# Patient Record
Sex: FEMALE | Race: WHITE | NOT HISPANIC OR LATINO | Employment: OTHER | URBAN - METROPOLITAN AREA
[De-identification: names, ages, dates, MRNs, and addresses within clinical notes are randomized per-mention and may not be internally consistent; named-entity substitution may affect disease eponyms.]

---

## 2021-10-21 ENCOUNTER — HOSPITAL ENCOUNTER (OUTPATIENT)
Dept: RADIOLOGY | Facility: HOSPITAL | Age: 66
Discharge: HOME | End: 2021-10-21
Attending: NURSE PRACTITIONER
Payer: MEDICARE

## 2021-10-21 VITALS — BODY MASS INDEX: 34.36 KG/M2 | HEIGHT: 60 IN | WEIGHT: 175 LBS

## 2021-10-21 DIAGNOSIS — Z12.31 BREAST CANCER SCREENING BY MAMMOGRAM: ICD-10-CM

## 2021-10-21 DIAGNOSIS — Z78.0 MENOPAUSE: ICD-10-CM

## 2021-10-21 DIAGNOSIS — J42 CHRONIC BRONCHITIS: ICD-10-CM

## 2021-10-21 PROCEDURE — 77080 DXA BONE DENSITY AXIAL: CPT | Mod: TC

## 2021-10-21 PROCEDURE — 71046 XR CHEST PA AND LATERAL: ICD-10-PCS | Mod: 26,,, | Performed by: RADIOLOGY

## 2021-10-21 PROCEDURE — 77080 DEXA BONE DENSITY SPINE HIP: ICD-10-PCS | Mod: 26,,, | Performed by: RADIOLOGY

## 2021-10-21 PROCEDURE — 77080 DXA BONE DENSITY AXIAL: CPT | Mod: 26,,, | Performed by: RADIOLOGY

## 2021-10-21 PROCEDURE — 71046 X-RAY EXAM CHEST 2 VIEWS: CPT | Mod: 26,,, | Performed by: RADIOLOGY

## 2021-10-21 PROCEDURE — 71046 X-RAY EXAM CHEST 2 VIEWS: CPT | Mod: TC,FY

## 2021-10-21 PROCEDURE — 77063 MAMMO DIGITAL SCREENING BILAT WITH TOMO: ICD-10-PCS | Mod: 26,,, | Performed by: RADIOLOGY

## 2021-10-21 PROCEDURE — 77067 MAMMO DIGITAL SCREENING BILAT WITH TOMO: ICD-10-PCS | Mod: 26,,, | Performed by: RADIOLOGY

## 2021-10-21 PROCEDURE — 77063 BREAST TOMOSYNTHESIS BI: CPT | Mod: 26,,, | Performed by: RADIOLOGY

## 2021-10-21 PROCEDURE — 77067 SCR MAMMO BI INCL CAD: CPT | Mod: 26,,, | Performed by: RADIOLOGY

## 2021-10-21 PROCEDURE — 77067 SCR MAMMO BI INCL CAD: CPT | Mod: TC

## 2022-05-26 ENCOUNTER — HOSPITAL ENCOUNTER (OUTPATIENT)
Dept: RADIOLOGY | Facility: HOSPITAL | Age: 67
Discharge: HOME | End: 2022-05-26
Attending: NURSE PRACTITIONER
Payer: MEDICARE

## 2022-05-26 DIAGNOSIS — R92.8 OTHER ABNORMAL AND INCONCLUSIVE FINDINGS ON DIAGNOSTIC IMAGING OF BREAST: ICD-10-CM

## 2022-05-26 PROCEDURE — 76642 ULTRASOUND BREAST LIMITED: CPT | Mod: 26,LT,, | Performed by: RADIOLOGY

## 2022-05-26 PROCEDURE — 76642 ULTRASOUND BREAST LIMITED: CPT | Mod: TC,LT

## 2022-05-26 PROCEDURE — 77065 MAMMO DIGITAL DIAGNOSTIC LEFT WITH TOMO: ICD-10-PCS | Mod: 26,LT,, | Performed by: RADIOLOGY

## 2022-05-26 PROCEDURE — 77061 BREAST TOMOSYNTHESIS UNI: CPT | Mod: 26,LT,, | Performed by: RADIOLOGY

## 2022-05-26 PROCEDURE — 77065 DX MAMMO INCL CAD UNI: CPT | Mod: TC,LT

## 2022-05-26 PROCEDURE — 76642 US BREAST LEFT LIMITED: ICD-10-PCS | Mod: 26,LT,, | Performed by: RADIOLOGY

## 2022-05-26 PROCEDURE — 77061 MAMMO DIGITAL DIAGNOSTIC LEFT WITH TOMO: ICD-10-PCS | Mod: 26,LT,, | Performed by: RADIOLOGY

## 2022-05-26 PROCEDURE — 77065 DX MAMMO INCL CAD UNI: CPT | Mod: 26,LT,, | Performed by: RADIOLOGY

## 2022-07-06 ENCOUNTER — HOSPITAL ENCOUNTER (OUTPATIENT)
Dept: RADIOLOGY | Facility: HOSPITAL | Age: 67
Discharge: HOME | End: 2022-07-06
Attending: OTOLARYNGOLOGY
Payer: MEDICARE

## 2022-07-06 DIAGNOSIS — Z87.891 HISTORY OF SMOKING GREATER THAN 50 PACK YEARS: ICD-10-CM

## 2022-07-06 PROCEDURE — 71271 CT CHEST LUNG SCREENING LOW DOSE: ICD-10-PCS | Mod: 26,,, | Performed by: RADIOLOGY

## 2022-07-06 PROCEDURE — 71271 CT THORAX LUNG CANCER SCR C-: CPT | Mod: 26,,, | Performed by: RADIOLOGY

## 2022-07-06 PROCEDURE — 71271 CT THORAX LUNG CANCER SCR C-: CPT | Mod: TC

## 2022-08-24 ENCOUNTER — HOSPITAL ENCOUNTER (OUTPATIENT)
Dept: RADIOLOGY | Facility: HOSPITAL | Age: 67
Discharge: HOME | End: 2022-08-24
Attending: OTOLARYNGOLOGY
Payer: MEDICARE

## 2022-08-24 DIAGNOSIS — J32.4 CHRONIC PANSINUSITIS: ICD-10-CM

## 2022-08-24 PROCEDURE — 70486 CT MAXILLOFACIAL W/O DYE: CPT | Mod: 26,,, | Performed by: RADIOLOGY

## 2022-08-24 PROCEDURE — 70486 CT MAXILLOFACIAL W/O DYE: CPT | Mod: TC

## 2022-08-24 PROCEDURE — 70486 CT MAXILLOFACIAL WITHOUT CONTRAST: ICD-10-PCS | Mod: 26,,, | Performed by: RADIOLOGY

## 2022-09-14 ENCOUNTER — HOSPITAL ENCOUNTER (OUTPATIENT)
Dept: PREADMISSION TESTING | Facility: HOSPITAL | Age: 67
Discharge: HOME | End: 2022-09-14
Attending: OTOLARYNGOLOGY
Payer: MEDICARE

## 2022-09-14 ENCOUNTER — ANESTHESIA EVENT (OUTPATIENT)
Dept: SURGERY | Facility: HOSPITAL | Age: 67
End: 2022-09-14
Payer: MEDICARE

## 2022-09-14 ENCOUNTER — HOSPITAL ENCOUNTER (OUTPATIENT)
Dept: CARDIOLOGY | Facility: HOSPITAL | Age: 67
Discharge: HOME | End: 2022-09-14
Attending: OTOLARYNGOLOGY
Payer: MEDICARE

## 2022-09-14 ENCOUNTER — HOSPITAL ENCOUNTER (OUTPATIENT)
Dept: RADIOLOGY | Facility: HOSPITAL | Age: 67
Discharge: HOME | End: 2022-09-14
Attending: OTOLARYNGOLOGY
Payer: MEDICARE

## 2022-09-14 DIAGNOSIS — J32.4 CHRONIC PANSINUSITIS: ICD-10-CM

## 2022-09-14 DIAGNOSIS — J32.4 CHRONIC PANSINUSITIS: Primary | ICD-10-CM

## 2022-09-14 PROCEDURE — 93010 ELECTROCARDIOGRAM REPORT: CPT | Mod: ,,, | Performed by: INTERNAL MEDICINE

## 2022-09-14 PROCEDURE — 93010 EKG 12-LEAD: ICD-10-PCS | Mod: ,,, | Performed by: INTERNAL MEDICINE

## 2022-09-14 PROCEDURE — 71046 X-RAY EXAM CHEST 2 VIEWS: CPT | Mod: TC

## 2022-09-14 PROCEDURE — 71046 XR CHEST PA AND LATERAL: ICD-10-PCS | Mod: 26,,, | Performed by: RADIOLOGY

## 2022-09-14 PROCEDURE — 71046 X-RAY EXAM CHEST 2 VIEWS: CPT | Mod: 26,,, | Performed by: RADIOLOGY

## 2022-09-14 PROCEDURE — 93005 ELECTROCARDIOGRAM TRACING: CPT

## 2022-09-14 NOTE — ANESTHESIA PREPROCEDURE EVALUATION
09/14/2022  Cyndi Cross is a 66 y.o., female.      Pre-op Assessment    I have reviewed the Patient Summary Reports.     I have reviewed the Nursing Notes.    I have reviewed the Medications.     Review of Systems  Anesthesia Hx:  No problems with previous Anesthesia  Neg history of prior surgery. Denies Family Hx of Anesthesia complications.   Denies Personal Hx of Anesthesia complications.   Social:  Smoker    Hematology/Oncology:  Hematology Normal   Oncology Normal     EENT/Dental:EENT/Dental Normal   Cardiovascular:  Cardiovascular Normal     Pulmonary:   COPD, mild    Renal/:  Renal/ Normal     Hepatic/GI:  Hepatic/GI Normal    Musculoskeletal:  Musculoskeletal Normal    Neurological:  Neurology Normal    Endocrine:  Endocrine Normal    Dermatological:  Skin Normal    Psych:  Psychiatric Normal           Physical Exam  General: Alert    Airway:  Mallampati: II   Mouth Opening: Normal  TM Distance: Normal  Neck ROM: Normal ROM    Dental:  Dentures, Partial Dentures    Chest/Lungs:  Clear to auscultation, Normal Respiratory Rate    Heart:  Rate: Normal    Abdomen:  Normal        Anesthesia Plan  Type of Anesthesia, risks & benefits discussed:    Anesthesia Type: Gen ETT  Post Op Pain Control Plan: multimodal analgesia  Airway Plan: Direct, Post-Induction  Informed Consent: Informed consent signed with the Patient and all parties understand the risks and agree with anesthesia plan.  All questions answered. Patient consented to blood products? No  ASA Score: 3  Day of Surgery Review of History & Physical: H&P Update referred to the surgeon/provider.    Ready For Surgery From Anesthesia Perspective.     .

## 2022-09-16 ENCOUNTER — ANESTHESIA (OUTPATIENT)
Dept: SURGERY | Facility: HOSPITAL | Age: 67
End: 2022-09-16
Payer: MEDICARE

## 2022-09-16 ENCOUNTER — HOSPITAL ENCOUNTER (OUTPATIENT)
Facility: HOSPITAL | Age: 67
Discharge: HOME | End: 2022-09-17
Attending: OTOLARYNGOLOGY | Admitting: FAMILY MEDICINE
Payer: MEDICARE

## 2022-09-16 DIAGNOSIS — R07.9 CHEST PAIN: ICD-10-CM

## 2022-09-16 DIAGNOSIS — R07.89 CHEST TIGHTNESS: ICD-10-CM

## 2022-09-16 PROBLEM — J34.2 DEVIATED NASAL SEPTUM: Status: ACTIVE | Noted: 2022-09-16

## 2022-09-16 LAB
ALLENS TEST: NORMAL
DELSYS: NORMAL
FIO2: 40
FLOW: 10
GRAM STN SPEC: NORMAL
HCO3 UR-SCNC: 24.8 MMOL/L (ref 24–28)
KOH PREP SPEC: NORMAL
MODE: NORMAL
PCO2 BLDA: 42.1 MMHG (ref 35–45)
PH SMN: 7.38 [PH] (ref 7.35–7.45)
PO2 BLDA: 89 MMHG (ref 80–100)
POC BE: 0 MMOL/L
POC SATURATED O2: 97 % (ref 95–100)
POC TCO2: 26 MMOL/L (ref 23–27)
SAMPLE: NORMAL
SITE: NORMAL
TROPONIN I SERPL DL<=0.01 NG/ML-MCNC: <0.006 NG/ML (ref 0–0.03)
TROPONIN I SERPL DL<=0.01 NG/ML-MCNC: <0.006 NG/ML (ref 0–0.03)

## 2022-09-16 PROCEDURE — D9220A PRA ANESTHESIA: Mod: CRNA,,, | Performed by: NURSE ANESTHETIST, CERTIFIED REGISTERED

## 2022-09-16 PROCEDURE — D9220A PRA ANESTHESIA: Mod: ANES,,, | Performed by: ANESTHESIOLOGY

## 2022-09-16 PROCEDURE — 37000009 HC ANESTHESIA EA ADD 15 MINS: Performed by: OTOLARYNGOLOGY

## 2022-09-16 PROCEDURE — 25000003 PHARM REV CODE 250: Performed by: FAMILY MEDICINE

## 2022-09-16 PROCEDURE — G0378 HOSPITAL OBSERVATION PER HR: HCPCS

## 2022-09-16 PROCEDURE — 88305 TISSUE EXAM BY PATHOLOGIST: CPT | Mod: 26,,, | Performed by: STUDENT IN AN ORGANIZED HEALTH CARE EDUCATION/TRAINING PROGRAM

## 2022-09-16 PROCEDURE — 25000003 PHARM REV CODE 250: Performed by: NURSE ANESTHETIST, CERTIFIED REGISTERED

## 2022-09-16 PROCEDURE — 99900035 HC TECH TIME PER 15 MIN (STAT)

## 2022-09-16 PROCEDURE — 27000221 HC OXYGEN, UP TO 24 HOURS

## 2022-09-16 PROCEDURE — 25000242 PHARM REV CODE 250 ALT 637 W/ HCPCS: Performed by: ANESTHESIOLOGY

## 2022-09-16 PROCEDURE — 87102 FUNGUS ISOLATION CULTURE: CPT | Performed by: OTOLARYNGOLOGY

## 2022-09-16 PROCEDURE — 88305 TISSUE EXAM BY PATHOLOGIST: CPT | Performed by: STUDENT IN AN ORGANIZED HEALTH CARE EDUCATION/TRAINING PROGRAM

## 2022-09-16 PROCEDURE — 37000008 HC ANESTHESIA 1ST 15 MINUTES: Performed by: OTOLARYNGOLOGY

## 2022-09-16 PROCEDURE — 87205 SMEAR GRAM STAIN: CPT | Performed by: OTOLARYNGOLOGY

## 2022-09-16 PROCEDURE — 93010 EKG 12-LEAD: ICD-10-PCS | Mod: ,,, | Performed by: INTERNAL MEDICINE

## 2022-09-16 PROCEDURE — 99220 PR INITIAL OBSERVATION CARE,LEVL III: ICD-10-PCS | Mod: ,,, | Performed by: FAMILY MEDICINE

## 2022-09-16 PROCEDURE — D9220A PRA ANESTHESIA: ICD-10-PCS | Mod: ANES,,, | Performed by: ANESTHESIOLOGY

## 2022-09-16 PROCEDURE — 36000708 HC OR TIME LEV III 1ST 15 MIN: Performed by: OTOLARYNGOLOGY

## 2022-09-16 PROCEDURE — 71000033 HC RECOVERY, INTIAL HOUR: Performed by: OTOLARYNGOLOGY

## 2022-09-16 PROCEDURE — 84484 ASSAY OF TROPONIN QUANT: CPT | Mod: 91 | Performed by: FAMILY MEDICINE

## 2022-09-16 PROCEDURE — 71000039 HC RECOVERY, EACH ADD'L HOUR: Performed by: OTOLARYNGOLOGY

## 2022-09-16 PROCEDURE — 87075 CULTR BACTERIA EXCEPT BLOOD: CPT | Performed by: OTOLARYNGOLOGY

## 2022-09-16 PROCEDURE — 87210 SMEAR WET MOUNT SALINE/INK: CPT | Performed by: OTOLARYNGOLOGY

## 2022-09-16 PROCEDURE — 36600 WITHDRAWAL OF ARTERIAL BLOOD: CPT

## 2022-09-16 PROCEDURE — 63600175 PHARM REV CODE 636 W HCPCS

## 2022-09-16 PROCEDURE — 87076 CULTURE ANAEROBE IDENT EACH: CPT | Mod: 59 | Performed by: OTOLARYNGOLOGY

## 2022-09-16 PROCEDURE — 88305 TISSUE EXAM BY PATHOLOGIST: ICD-10-PCS | Mod: 26,,, | Performed by: STUDENT IN AN ORGANIZED HEALTH CARE EDUCATION/TRAINING PROGRAM

## 2022-09-16 PROCEDURE — 82803 BLOOD GASES ANY COMBINATION: CPT

## 2022-09-16 PROCEDURE — 94640 AIRWAY INHALATION TREATMENT: CPT

## 2022-09-16 PROCEDURE — 87186 SC STD MICRODIL/AGAR DIL: CPT | Mod: 59 | Performed by: OTOLARYNGOLOGY

## 2022-09-16 PROCEDURE — 93005 ELECTROCARDIOGRAM TRACING: CPT | Mod: 59

## 2022-09-16 PROCEDURE — 93010 ELECTROCARDIOGRAM REPORT: CPT | Mod: ,,, | Performed by: INTERNAL MEDICINE

## 2022-09-16 PROCEDURE — 99220 PR INITIAL OBSERVATION CARE,LEVL III: CPT | Mod: ,,, | Performed by: FAMILY MEDICINE

## 2022-09-16 PROCEDURE — 63600175 PHARM REV CODE 636 W HCPCS: Performed by: NURSE ANESTHETIST, CERTIFIED REGISTERED

## 2022-09-16 PROCEDURE — 36000709 HC OR TIME LEV III EA ADD 15 MIN: Performed by: OTOLARYNGOLOGY

## 2022-09-16 PROCEDURE — 87077 CULTURE AEROBIC IDENTIFY: CPT | Performed by: OTOLARYNGOLOGY

## 2022-09-16 PROCEDURE — 87070 CULTURE OTHR SPECIMN AEROBIC: CPT | Performed by: OTOLARYNGOLOGY

## 2022-09-16 PROCEDURE — 63600175 PHARM REV CODE 636 W HCPCS: Performed by: ANESTHESIOLOGY

## 2022-09-16 PROCEDURE — 25000242 PHARM REV CODE 250 ALT 637 W/ HCPCS

## 2022-09-16 PROCEDURE — D9220A PRA ANESTHESIA: ICD-10-PCS | Mod: CRNA,,, | Performed by: NURSE ANESTHETIST, CERTIFIED REGISTERED

## 2022-09-16 PROCEDURE — 36415 COLL VENOUS BLD VENIPUNCTURE: CPT | Performed by: FAMILY MEDICINE

## 2022-09-16 RX ORDER — EZETIMIBE 10 MG/1
10 TABLET ORAL DAILY
COMMUNITY

## 2022-09-16 RX ORDER — LANOLIN ALCOHOL/MO/W.PET/CERES
800 CREAM (GRAM) TOPICAL
Status: DISCONTINUED | OUTPATIENT
Start: 2022-09-16 | End: 2022-09-17 | Stop reason: HOSPADM

## 2022-09-16 RX ORDER — DOXEPIN HYDROCHLORIDE 10 MG/1
10 CAPSULE ORAL NIGHTLY PRN
COMMUNITY

## 2022-09-16 RX ORDER — ROSUVASTATIN CALCIUM 10 MG/1
10 TABLET, COATED ORAL DAILY
COMMUNITY

## 2022-09-16 RX ORDER — SODIUM,POTASSIUM PHOSPHATES 280-250MG
2 POWDER IN PACKET (EA) ORAL
Status: DISCONTINUED | OUTPATIENT
Start: 2022-09-16 | End: 2022-09-17 | Stop reason: HOSPADM

## 2022-09-16 RX ORDER — PROPOFOL 10 MG/ML
VIAL (ML) INTRAVENOUS
Status: DISCONTINUED | OUTPATIENT
Start: 2022-09-16 | End: 2022-09-16

## 2022-09-16 RX ORDER — HEPARIN SODIUM 5000 [USP'U]/ML
5000 INJECTION, SOLUTION INTRAVENOUS; SUBCUTANEOUS EVERY 8 HOURS
Status: DISCONTINUED | OUTPATIENT
Start: 2022-09-17 | End: 2022-09-17 | Stop reason: HOSPADM

## 2022-09-16 RX ORDER — MIDAZOLAM HYDROCHLORIDE 1 MG/ML
1 INJECTION INTRAMUSCULAR; INTRAVENOUS ONCE
Status: COMPLETED | OUTPATIENT
Start: 2022-09-16 | End: 2022-09-16

## 2022-09-16 RX ORDER — MORPHINE SULFATE 4 MG/ML
2 INJECTION, SOLUTION INTRAMUSCULAR; INTRAVENOUS EVERY 4 HOURS PRN
Status: DISCONTINUED | OUTPATIENT
Start: 2022-09-16 | End: 2022-09-17 | Stop reason: HOSPADM

## 2022-09-16 RX ORDER — GLUCOSAMINE/CHONDRO SU A 500-400 MG
1 TABLET ORAL 3 TIMES DAILY
COMMUNITY

## 2022-09-16 RX ORDER — SODIUM CHLORIDE, SODIUM LACTATE, POTASSIUM CHLORIDE, CALCIUM CHLORIDE 600; 310; 30; 20 MG/100ML; MG/100ML; MG/100ML; MG/100ML
INJECTION, SOLUTION INTRAVENOUS CONTINUOUS
Status: DISCONTINUED | OUTPATIENT
Start: 2022-09-16 | End: 2022-09-16

## 2022-09-16 RX ORDER — MIDAZOLAM HYDROCHLORIDE 1 MG/ML
INJECTION, SOLUTION INTRAMUSCULAR; INTRAVENOUS
Status: DISCONTINUED | OUTPATIENT
Start: 2022-09-16 | End: 2022-09-16

## 2022-09-16 RX ORDER — CEFAZOLIN SODIUM 1 G/3ML
INJECTION, POWDER, FOR SOLUTION INTRAMUSCULAR; INTRAVENOUS
Status: DISCONTINUED | OUTPATIENT
Start: 2022-09-16 | End: 2022-09-16

## 2022-09-16 RX ORDER — ONDANSETRON 2 MG/ML
4 INJECTION INTRAMUSCULAR; INTRAVENOUS EVERY 8 HOURS PRN
Status: DISCONTINUED | OUTPATIENT
Start: 2022-09-16 | End: 2022-09-17 | Stop reason: HOSPADM

## 2022-09-16 RX ORDER — IBUPROFEN 200 MG
24 TABLET ORAL
Status: DISCONTINUED | OUTPATIENT
Start: 2022-09-16 | End: 2022-09-17 | Stop reason: HOSPADM

## 2022-09-16 RX ORDER — PROMETHAZINE HYDROCHLORIDE 12.5 MG/1
25 TABLET ORAL EVERY 6 HOURS PRN
Status: DISCONTINUED | OUTPATIENT
Start: 2022-09-16 | End: 2022-09-17 | Stop reason: HOSPADM

## 2022-09-16 RX ORDER — NALOXONE HCL 0.4 MG/ML
0.02 VIAL (ML) INJECTION
Status: DISCONTINUED | OUTPATIENT
Start: 2022-09-16 | End: 2022-09-17 | Stop reason: HOSPADM

## 2022-09-16 RX ORDER — SODIUM CHLORIDE, SODIUM LACTATE, POTASSIUM CHLORIDE, CALCIUM CHLORIDE 600; 310; 30; 20 MG/100ML; MG/100ML; MG/100ML; MG/100ML
125 INJECTION, SOLUTION INTRAVENOUS CONTINUOUS
Status: DISCONTINUED | OUTPATIENT
Start: 2022-09-16 | End: 2022-09-16

## 2022-09-16 RX ORDER — MIDAZOLAM HYDROCHLORIDE 1 MG/ML
2 INJECTION INTRAMUSCULAR; INTRAVENOUS ONCE
Status: COMPLETED | OUTPATIENT
Start: 2022-09-16 | End: 2022-09-16

## 2022-09-16 RX ORDER — IPRATROPIUM BROMIDE AND ALBUTEROL SULFATE 2.5; .5 MG/3ML; MG/3ML
3 SOLUTION RESPIRATORY (INHALATION) ONCE
Status: COMPLETED | OUTPATIENT
Start: 2022-09-16 | End: 2022-09-16

## 2022-09-16 RX ORDER — LIDOCAINE HYDROCHLORIDE 20 MG/ML
INJECTION, SOLUTION EPIDURAL; INFILTRATION; INTRACAUDAL; PERINEURAL
Status: DISCONTINUED | OUTPATIENT
Start: 2022-09-16 | End: 2022-09-16

## 2022-09-16 RX ORDER — POLYETHYLENE GLYCOL 3350 17 G/17G
17 POWDER, FOR SOLUTION ORAL DAILY PRN
Status: DISCONTINUED | OUTPATIENT
Start: 2022-09-16 | End: 2022-09-17 | Stop reason: HOSPADM

## 2022-09-16 RX ORDER — MORPHINE SULFATE 4 MG/ML
4 INJECTION, SOLUTION INTRAMUSCULAR; INTRAVENOUS ONCE
Status: COMPLETED | OUTPATIENT
Start: 2022-09-16 | End: 2022-09-16

## 2022-09-16 RX ORDER — SUCCINYLCHOLINE CHLORIDE 20 MG/ML
INJECTION INTRAMUSCULAR; INTRAVENOUS
Status: DISCONTINUED | OUTPATIENT
Start: 2022-09-16 | End: 2022-09-16

## 2022-09-16 RX ORDER — DIAZEPAM 5 MG/1
5 TABLET ORAL EVERY 6 HOURS PRN
Status: DISCONTINUED | OUTPATIENT
Start: 2022-09-16 | End: 2022-09-17 | Stop reason: HOSPADM

## 2022-09-16 RX ORDER — ONDANSETRON 2 MG/ML
INJECTION INTRAMUSCULAR; INTRAVENOUS
Status: DISCONTINUED | OUTPATIENT
Start: 2022-09-16 | End: 2022-09-16

## 2022-09-16 RX ORDER — HYDROCODONE BITARTRATE AND ACETAMINOPHEN 5; 325 MG/1; MG/1
1 TABLET ORAL EVERY 6 HOURS PRN
Status: DISCONTINUED | OUTPATIENT
Start: 2022-09-16 | End: 2022-09-17 | Stop reason: HOSPADM

## 2022-09-16 RX ORDER — SODIUM CHLORIDE 0.9 % (FLUSH) 0.9 %
10 SYRINGE (ML) INJECTION EVERY 8 HOURS PRN
Status: DISCONTINUED | OUTPATIENT
Start: 2022-09-16 | End: 2022-09-17 | Stop reason: HOSPADM

## 2022-09-16 RX ORDER — EPHEDRINE SULFATE 50 MG/ML
INJECTION, SOLUTION INTRAVENOUS
Status: DISCONTINUED | OUTPATIENT
Start: 2022-09-16 | End: 2022-09-16

## 2022-09-16 RX ORDER — MIDAZOLAM HYDROCHLORIDE 1 MG/ML
INJECTION INTRAMUSCULAR; INTRAVENOUS
Status: DISPENSED
Start: 2022-09-16 | End: 2022-09-17

## 2022-09-16 RX ORDER — OXYCODONE AND ACETAMINOPHEN 5; 325 MG/1; MG/1
1 TABLET ORAL
Status: DISCONTINUED | OUTPATIENT
Start: 2022-09-16 | End: 2022-09-16 | Stop reason: HOSPADM

## 2022-09-16 RX ORDER — ASPIRIN 81 MG/1
81 TABLET ORAL DAILY
COMMUNITY

## 2022-09-16 RX ORDER — KETOROLAC TROMETHAMINE 30 MG/ML
15 INJECTION, SOLUTION INTRAMUSCULAR; INTRAVENOUS ONCE
Status: DISCONTINUED | OUTPATIENT
Start: 2022-09-16 | End: 2022-09-16

## 2022-09-16 RX ORDER — ATORVASTATIN CALCIUM 40 MG/1
40 TABLET, FILM COATED ORAL NIGHTLY
Status: DISCONTINUED | OUTPATIENT
Start: 2022-09-16 | End: 2022-09-17 | Stop reason: HOSPADM

## 2022-09-16 RX ORDER — IBUPROFEN 200 MG
16 TABLET ORAL
Status: DISCONTINUED | OUTPATIENT
Start: 2022-09-16 | End: 2022-09-17 | Stop reason: HOSPADM

## 2022-09-16 RX ORDER — MORPHINE SULFATE 4 MG/ML
INJECTION INTRAVENOUS
Status: DISCONTINUED | OUTPATIENT
Start: 2022-09-16 | End: 2022-09-16

## 2022-09-16 RX ORDER — MORPHINE SULFATE 4 MG/ML
2 INJECTION, SOLUTION INTRAMUSCULAR; INTRAVENOUS EVERY 5 MIN PRN
Status: DISCONTINUED | OUTPATIENT
Start: 2022-09-16 | End: 2022-09-16 | Stop reason: HOSPADM

## 2022-09-16 RX ORDER — MIDAZOLAM HYDROCHLORIDE 1 MG/ML
1 INJECTION INTRAMUSCULAR; INTRAVENOUS ONCE
Status: DISCONTINUED | OUTPATIENT
Start: 2022-09-16 | End: 2022-09-16

## 2022-09-16 RX ORDER — GLUCAGON 1 MG
1 KIT INJECTION
Status: DISCONTINUED | OUTPATIENT
Start: 2022-09-16 | End: 2022-09-17 | Stop reason: HOSPADM

## 2022-09-16 RX ORDER — ACETAMINOPHEN 325 MG/1
650 TABLET ORAL EVERY 4 HOURS PRN
Status: DISCONTINUED | OUTPATIENT
Start: 2022-09-16 | End: 2022-09-17 | Stop reason: HOSPADM

## 2022-09-16 RX ORDER — DEXAMETHASONE SODIUM PHOSPHATE 4 MG/ML
INJECTION, SOLUTION INTRA-ARTICULAR; INTRALESIONAL; INTRAMUSCULAR; INTRAVENOUS; SOFT TISSUE
Status: DISCONTINUED | OUTPATIENT
Start: 2022-09-16 | End: 2022-09-16

## 2022-09-16 RX ORDER — MORPHINE SULFATE 4 MG/ML
INJECTION, SOLUTION INTRAMUSCULAR; INTRAVENOUS
Status: COMPLETED
Start: 2022-09-16 | End: 2022-09-16

## 2022-09-16 RX ORDER — LIDOCAINE HYDROCHLORIDE 10 MG/ML
1 INJECTION, SOLUTION EPIDURAL; INFILTRATION; INTRACAUDAL; PERINEURAL ONCE
Status: DISCONTINUED | OUTPATIENT
Start: 2022-09-16 | End: 2022-09-16 | Stop reason: HOSPADM

## 2022-09-16 RX ORDER — IPRATROPIUM BROMIDE AND ALBUTEROL SULFATE 2.5; .5 MG/3ML; MG/3ML
SOLUTION RESPIRATORY (INHALATION)
Status: COMPLETED
Start: 2022-09-16 | End: 2022-09-16

## 2022-09-16 RX ORDER — ONDANSETRON 2 MG/ML
4 INJECTION INTRAMUSCULAR; INTRAVENOUS DAILY PRN
Status: DISCONTINUED | OUTPATIENT
Start: 2022-09-16 | End: 2022-09-16 | Stop reason: HOSPADM

## 2022-09-16 RX ORDER — IPRATROPIUM BROMIDE AND ALBUTEROL SULFATE 2.5; .5 MG/3ML; MG/3ML
3 SOLUTION RESPIRATORY (INHALATION) EVERY 6 HOURS PRN
Status: DISCONTINUED | OUTPATIENT
Start: 2022-09-16 | End: 2022-09-17 | Stop reason: HOSPADM

## 2022-09-16 RX ORDER — LORATADINE 10 MG/1
10 TABLET ORAL DAILY
COMMUNITY

## 2022-09-16 RX ORDER — NITROGLYCERIN 0.4 MG/1
0.4 TABLET SUBLINGUAL EVERY 5 MIN PRN
Status: DISCONTINUED | OUTPATIENT
Start: 2022-09-16 | End: 2022-09-17 | Stop reason: HOSPADM

## 2022-09-16 RX ORDER — DOXEPIN HYDROCHLORIDE 10 MG/1
10 CAPSULE ORAL NIGHTLY PRN
Status: DISCONTINUED | OUTPATIENT
Start: 2022-09-16 | End: 2022-09-17 | Stop reason: HOSPADM

## 2022-09-16 RX ADMIN — ACETAMINOPHEN 650 MG: 325 TABLET ORAL at 08:09

## 2022-09-16 RX ADMIN — MIDAZOLAM HYDROCHLORIDE 1 MG: 1 INJECTION, SOLUTION INTRAMUSCULAR; INTRAVENOUS at 01:09

## 2022-09-16 RX ADMIN — CEFAZOLIN 1 G: 330 INJECTION, POWDER, FOR SOLUTION INTRAMUSCULAR; INTRAVENOUS at 11:09

## 2022-09-16 RX ADMIN — MORPHINE SULFATE 2 MG: 4 INJECTION INTRAVENOUS at 01:09

## 2022-09-16 RX ADMIN — MIDAZOLAM HYDROCHLORIDE 2 MG: 1 INJECTION, SOLUTION INTRAMUSCULAR; INTRAVENOUS at 11:09

## 2022-09-16 RX ADMIN — MIDAZOLAM HYDROCHLORIDE 2 MG: 1 INJECTION, SOLUTION INTRAMUSCULAR; INTRAVENOUS at 01:09

## 2022-09-16 RX ADMIN — NITROGLYCERIN 0.4 MG: 0.4 TABLET SUBLINGUAL at 02:09

## 2022-09-16 RX ADMIN — EPHEDRINE SULFATE 10 MG: 50 INJECTION INTRAVENOUS at 12:09

## 2022-09-16 RX ADMIN — LIDOCAINE HYDROCHLORIDE 50 MG: 20 INJECTION, SOLUTION EPIDURAL; INFILTRATION; INTRACAUDAL at 11:09

## 2022-09-16 RX ADMIN — SUCCINYLCHOLINE CHLORIDE 100 MG: 20 INJECTION, SOLUTION INTRAMUSCULAR; INTRAVENOUS at 11:09

## 2022-09-16 RX ADMIN — PROPOFOL 200 MG: 10 INJECTION, EMULSION INTRAVENOUS at 11:09

## 2022-09-16 RX ADMIN — HYDROCODONE BITARTRATE AND ACETAMINOPHEN 1 TABLET: 5; 325 TABLET ORAL at 08:09

## 2022-09-16 RX ADMIN — DEXAMETHASONE SODIUM PHOSPHATE 16 MG: 4 INJECTION, SOLUTION INTRA-ARTICULAR; INTRALESIONAL; INTRAMUSCULAR; INTRAVENOUS; SOFT TISSUE at 11:09

## 2022-09-16 RX ADMIN — DIAZEPAM 5 MG: 5 TABLET ORAL at 08:09

## 2022-09-16 RX ADMIN — IPRATROPIUM BROMIDE AND ALBUTEROL SULFATE 3 ML: 2.5; .5 SOLUTION RESPIRATORY (INHALATION) at 01:09

## 2022-09-16 RX ADMIN — MORPHINE SULFATE 4 MG: 4 INJECTION INTRAVENOUS at 11:09

## 2022-09-16 RX ADMIN — SODIUM CHLORIDE, SODIUM LACTATE, POTASSIUM CHLORIDE, AND CALCIUM CHLORIDE: .6; .31; .03; .02 INJECTION, SOLUTION INTRAVENOUS at 11:09

## 2022-09-16 RX ADMIN — PROMETHAZINE HYDROCHLORIDE 25 MG: 12.5 TABLET ORAL at 08:09

## 2022-09-16 RX ADMIN — ONDANSETRON HYDROCHLORIDE 4 MG: 2 SOLUTION INTRAMUSCULAR; INTRAVENOUS at 11:09

## 2022-09-16 RX ADMIN — MORPHINE SULFATE 4 MG: 4 INJECTION INTRAVENOUS at 01:09

## 2022-09-16 RX ADMIN — ATORVASTATIN CALCIUM 40 MG: 40 TABLET, FILM COATED ORAL at 08:09

## 2022-09-16 RX ADMIN — EPHEDRINE SULFATE 10 MG: 50 INJECTION INTRAVENOUS at 11:09

## 2022-09-16 NOTE — BRIEF OP NOTE
[facility] - [facility]  Brief Operative Note     SUMMARY     Surgery Date: 9/16/2022     Surgeon(s) and Role:     * Nabeel Fernandez MD - Primary        Pre-op Diagnosis:  Deviated nasal septum [J34.2]    Post-op Diagnosis:  Post-Op Diagnosis Codes:     * Deviated nasal septum [J34.2]    Procedure(s) (LRB):  SEPTOPLASTY, NOSE (N/A)  EXCISION, NASAL TURBINATE (N/A)  ENDOSCOPY, NOSE (N/A)  FESS (FUNCTIONAL ENDOSCOPIC SINUS SURGERY) (N/A)      Description of the findings of the procedure:  Deviated nasal septum [J34.2]      Estimated Blood Loss: * No values recorded between 9/16/2022 12:00 AM and 9/16/2022 11:43 AM *

## 2022-09-16 NOTE — ANESTHESIA PROCEDURE NOTES
Intubation    Date/Time: 9/16/2022 11:32 AM  Performed by: Fam Norris CRNA  Authorized by: Rogelio Cotto MD     Intubation:     Induction:  Rapid sequence induction    Intubated:  Postinduction    Mask Ventilation:  Easy mask    Attempted By:  CRNA    Method of Intubation:  Direct    Blade:  Gilberto 3    Laryngeal View Grade: Grade I - full view of cords      Difficult Airway Encountered?: No      Complications:  None    Airway Device:  Oral endotracheal tube    Airway Device Size:  7.0    Style/Cuff Inflation:  Cuffed    Tube secured:  25    Secured at:  The teeth    Placement Verified By:  Capnometry    Complicating Factors:  None    Findings Post-Intubation:  BS equal bilateral

## 2022-09-16 NOTE — OP NOTE
[facility] - Surgery  Operative Note     SUMMARY     Surgery Date: 9/16/2022       Pre-op Diagnosis:  Deviated nasal septum [J34.2]    Post-op Diagnosis:  Post-Op Diagnosis Codes:     * Deviated nasal septum [J34.2]    Procedure(s) (LRB):  SEPTOPLASTY, NOSE (N/A)  EXCISION, NASAL TURBINATE (N/A)  ENDOSCOPY, NOSE (N/A)  FESS (FUNCTIONAL ENDOSCOPIC SINUS SURGERY) (N/A)    Surgeon(s) and Role:     * Nabeel Fernandez MD - Primary      Estimated Blood Loss: * No values recorded between 9/16/2022 12:00 AM and 9/16/2022 11:42 AM *    Anesthesia:  General    Description of the findings of the procedure:  Deviated nasal septum [J34.2]           Procedure: [facility] - Surgery  Operative Note    OP Note      Date of Procedure: 9/16/2022       Pre-Operative Diagnosis:   Deviated nasal septum [J34.2]    Post-Operative Diagnosis:   Post-Op Diagnosis Codes:     * Deviated nasal septum [J34.2]    Anesthesia: General    Procedures performed:   SEPTOPLASTY, NOSE:   EXCISION, NASAL TURBINATE:   ENDOSCOPY, NOSE:   FESS (FUNCTIONAL ENDOSCOPIC SINUS SURGERY):     Surgeon: Nabeel Fernandez MD    Procedure In Detail:   The patient was taken to the operating room and placed in the supine position. An IV was placed into the patient's arm. After a satisfactory general anesthetic had been obtained, the procedure was begun. Four pledgets of 4 mL of oxymetazoline were placed into the patient's nose. The patient's nose was injected with lidocaine 1% with epinephrine 1:100,000; approximately 10 mL were used. The patient was prepped and draped in the standard fashion for a nasal operation.    Under endoscopic vision, the [place] was walked down the lateral nasal wall of the right nostril until it fell into the anterior ethmoid cells. The anterior ethmoid cells were exenterated back to the lamina ethmoidalis. The lamina ethmoidalis was breached and the posterior ethmoid cells exenterated back to the rostrum of the sphenoid. Under endoscopic vision,  the sphenoid ostium was viewed and palpated and opened to approximately 5 mm in diameter. The 30 degree scope was taken and the maxillary sinus viewed and palpated and the ostium opened to approximately 1 cm in diameter. With the 70 degree scope, the nasofrontal duct area was viewed and the anterior ethmoid cells taken down until the nasofrontal duct was seen to empty widely into the nose. This nostril was then packed with 2 oxymetazoline pledgets and attention turned to the left side.    The [place] was walked down the lateral nasal wall of the left nostril until it fell into the anterior ethmoid cells. The anterior ethmoid cells were exenterated back to the lamina ethmoidalis. The lamina ethmoidalis was breached and the posterior ethmoid cells exenterated back to the rostrum of the sphenoid. The 0 degree scope was taken and the sphenoid ostium viewed and palpated. This was opened to approximately 5 mm in diameter. The 30 degree scope was taken and the maxillary sinus ostium viewed and palpated and opened to approximately 1 cm in diameter. The 70 degree scope was taken and the nasofrontal duct area viewed, and the anterior ethmoid cells in this region were taken down until the nasofrontal duct was seen to empty widely into the nose. The left nostril was then packed with oxymetazoline pledgets.    The pledgets were removed.  The septum was viewed. A caudal incision was performed, and a mucoperichondrial flap elevated back to the bony cartilaginous junction. A mucoperiosteal flap was elevated on both sides of the bony cartilaginous junction. The anterior 5 mm of bony septum was then taken down from inferior to superior, and the posterior 5 mm of cartilaginous septum was removed. A 3 mm strip of the inferior edge of the cartilaginous septum was removed. This allowed the septum to swing back into the midline over the crest of the maxilla. The areas of deflexion along the cartilaginous septum were then conservatively  removed. The septum was then found to be straight in the midline. The caudal incision was then closed with 4-0 plain and a 4-0 whipstitch placed through the septum.    Next, the left and right inferior turbinates were then viewed. They were both hypertrophic producing nasal airway obstruction. The anterior tips of each turbinate were then injected with lidocaine 1% with 1:100,000 epinephrine; approximately 2 mL were used in each turbinate. This was injected over the anterior 2 cm. A microdebrider was then taken and used to vaughan the anterior tip of both the left and right inferior turbinate. This was carried back, while being activated, 2 cm along the medial and inferior border of the left and right inferior turbinate. This was done until substantial volume reduction had been accomplished. After removing the microdebrider from each turbinate, the turbinate was viewed and found to be markedly reduced in size. Hemostasis was obtained.  The nose and sinuses were packed with [place] nasal packs coated with bacitracin and Sinus packs coated with bacitracin as needed. The patient was awakened and taken to the recovery room in stable condition.

## 2022-09-16 NOTE — NURSING
Pt brought to room via stretcher with report received from pacu nurse. Pt is A&Ox4, vitals w/n/l. Pt ambulated with one person assist from stretcher to room bed. Pt also voided sitting on toilet. Pt oriented to room, bed rails up x2, call light in reach.

## 2022-09-16 NOTE — PLAN OF CARE
Dr Cotto at bedside. Pt remains short of breath after breathing treatment and coaching pt to slow breathing down. Dr Cotto states pt's lungs are clear. EKG ordered and 2mg of versed iv push now. Pt on room air, sats remain 97%. Bleeding slowing from nose. 2 dressings with moderate amount of bloody drainage changed.

## 2022-09-16 NOTE — HPI
65 yo female with PMH of COPD presents as an admission post-operatively after sinus surgery. She developed chest pain post-operatively that was substernal and radiating to the left arm. Had been coughing and felt short of breath and anxious then developed 10/10 chest pain. EKG without evidence of STEMI. Initially troponin normal. Given SL nitro x2, morphine 10 mg x1, and versed which relieved pain. Of note, she had a heart catheterization approximately 5 years ago that was completely normal. The test was performed at that time because of chest pain associated with anxiety. Hospital team called for admission.

## 2022-09-16 NOTE — SUBJECTIVE & OBJECTIVE
Past Medical History:   Diagnosis Date    COPD (chronic obstructive pulmonary disease)        Past Surgical History:   Procedure Laterality Date    APPENDECTOMY      BREAST CYST EXCISION Right 2006    COLECTOMY      COLOSTOMY      COLOSTOMY CLOSURE      GALLBLADDER SURGERY      HYSTERECTOMY      OOPHORECTOMY         Review of patient's allergies indicates:   Allergen Reactions    Iodine Swelling    Sulfa (sulfonamide antibiotics)     Codeine Nausea And Vomiting    Zinc Rash       No current facility-administered medications on file prior to encounter.     Current Outpatient Medications on File Prior to Encounter   Medication Sig    doxepin (SINEQUAN) 10 MG capsule Take 10 mg by mouth nightly as needed.    ezetimibe (ZETIA) 10 mg tablet Take 10 mg by mouth once daily.    glucosamine-chondroitin 500-400 mg tablet Take 1 tablet by mouth 3 (three) times daily.    rosuvastatin (CRESTOR) 10 MG tablet Take 10 mg by mouth once daily.    aspirin (ECOTRIN) 81 MG EC tablet Take 81 mg by mouth once daily.    loratadine (CLARITIN) 10 mg tablet Take 10 mg by mouth once daily.    potassium bicarbonate disintegrating tablet Take by mouth 2 (two) times daily.     Family History       Problem Relation (Age of Onset)    Breast cancer Maternal Aunt, Paternal Aunt          Tobacco Use    Smoking status: Every Day     Packs/day: 1.00     Years: 50.00     Pack years: 50.00     Types: Cigarettes    Smokeless tobacco: Never   Substance and Sexual Activity    Alcohol use: Yes     Alcohol/week: 6.0 standard drinks     Types: 6 Cans of beer per week     Comment: 6 beers daily    Drug use: Never    Sexual activity: Not on file     Review of Systems   Constitutional:  Negative for fatigue and fever.   HENT:  Positive for sinus pressure and sinus pain.         Post-op sinus surgery   Eyes:  Negative for visual disturbance.   Respiratory:  Negative for shortness of breath.    Cardiovascular:  Positive for chest pain.  Negative for leg swelling.   Gastrointestinal:  Negative for abdominal pain.   Endocrine: Negative.    Genitourinary: Negative.    Musculoskeletal: Negative.    Skin: Negative.    Allergic/Immunologic: Negative.    Neurological: Negative.    Hematological: Negative.    Psychiatric/Behavioral: Negative.     Objective:     Vital Signs (Most Recent):  Temp: 97.2 °F (36.2 °C) (09/16/22 1240)  Pulse: 99 (09/16/22 1509)  Resp: 18 (09/16/22 1509)  BP: (!) 108/59 (09/16/22 1509)  SpO2: (!) 94 % (09/16/22 1509)   Vital Signs (24h Range):  Temp:  [97.2 °F (36.2 °C)-98.1 °F (36.7 °C)] 97.2 °F (36.2 °C)  Pulse:  [] 99  Resp:  [9-28] 18  SpO2:  [94 %-100 %] 94 %  BP: ()/() 108/59     Weight: 79.4 kg (175 lb)  Body mass index is 34.18 kg/m².    Physical Exam  Vitals reviewed.   Constitutional:       General: She is not in acute distress.     Appearance: She is not toxic-appearing or diaphoretic.   HENT:      Head: Normocephalic and atraumatic.      Right Ear: External ear normal.      Left Ear: External ear normal.      Nose: Nose normal.      Mouth/Throat:      Mouth: Mucous membranes are moist.   Cardiovascular:      Rate and Rhythm: Normal rate and regular rhythm.      Pulses: Normal pulses.      Heart sounds: No murmur heard.  Pulmonary:      Effort: Pulmonary effort is normal. No respiratory distress.   Abdominal:      General: There is no distension.      Palpations: Abdomen is soft.   Musculoskeletal:      Right lower leg: No edema.      Left lower leg: No edema.   Skin:     General: Skin is warm and dry.   Neurological:      General: No focal deficit present.      Mental Status: She is alert.   Psychiatric:      Comments: Anxious mood and behavior           Significant Labs: All pertinent labs within the past 24 hours have been reviewed.  Troponin:   Recent Labs   Lab 09/16/22  1407   TROPONINI <0.006       Significant Imaging: I have reviewed all pertinent imaging results/findings within the past 24  hours.

## 2022-09-16 NOTE — PLAN OF CARE
Report called to [facility] on medsurg. Pt going to room 118. Pt's  given an update and told he can meet pt in room 118

## 2022-09-16 NOTE — ASSESSMENT & PLAN NOTE
Substernal radiating to left arm   Trend troponin, initial negative with no EKG changes  EKG prn chest pain  Continuous cardiac monitoring   SL nitro prn chest pain

## 2022-09-16 NOTE — PLAN OF CARE
Pt transferred to room 118 via stretcher and ambulated to bed.  at bedside. Azul given bedside report. Pt in stable condition at this time.

## 2022-09-16 NOTE — TRANSFER OF CARE
Anesthesia Transfer of Care Note    Patient: Cyndi Cross    Procedure(s) Performed: Procedure(s) (LRB):  SEPTOPLASTY, NOSE (N/A)  EXCISION, NASAL TURBINATE (N/A)  ENDOSCOPY, NOSE (N/A)  FESS (FUNCTIONAL ENDOSCOPIC SINUS SURGERY) (N/A)    Patient location: PACU    Anesthesia Type: general    Transport from OR: Transported from OR on room air with adequate spontaneous ventilation    Post pain: adequate analgesia    Post assessment: no apparent anesthetic complications and tolerated procedure well    Post vital signs: stable    Level of consciousness: awake, alert and oriented    Nausea/Vomiting: no nausea/vomiting    Complications: none    Transfer of care protocol was followed      Last vitals:   Visit Vitals  /63   Pulse 72   Temp 36.7 °C (98.1 °F) (Oral)   Resp 12   Ht 5' (1.524 m)   Wt 79.4 kg (175 lb)   SpO2 96%   Breastfeeding No   BMI 34.18 kg/m²

## 2022-09-16 NOTE — H&P
[facility] Medicine  History & Physical    Patient Name: Cyndi Cross  MRN: [id number]  Patient Class: OP- Observation  Admission Date: 9/16/2022  Attending Physician: Carlie Camarillo MD   Primary Care Provider: Nora Morocho NP         Patient information was obtained from patient and Anesthesia.     Subjective:     Principal Problem:Chest pain    Chief Complaint: Chest pain     HPI: 65 yo female with PMH of COPD presents as an admission post-operatively after sinus surgery. She developed chest pain post-operatively that was substernal and radiating to the left arm. Had been coughing and felt short of breath and anxious then developed 10/10 chest pain. EKG without evidence of STEMI. Initially troponin normal. Given SL nitro x2, morphine 10 mg x1, and versed which relieved pain. Hospital team called for admission.      Past Medical History:   Diagnosis Date    COPD (chronic obstructive pulmonary disease)        Past Surgical History:   Procedure Laterality Date    APPENDECTOMY      BREAST CYST EXCISION Right 2006    COLECTOMY      COLOSTOMY      COLOSTOMY CLOSURE      GALLBLADDER SURGERY      HYSTERECTOMY      OOPHORECTOMY         Review of patient's allergies indicates:   Allergen Reactions    Iodine Swelling    Sulfa (sulfonamide antibiotics)     Codeine Nausea And Vomiting    Zinc Rash       No current facility-administered medications on file prior to encounter.     Current Outpatient Medications on File Prior to Encounter   Medication Sig    doxepin (SINEQUAN) 10 MG capsule Take 10 mg by mouth nightly as needed.    ezetimibe (ZETIA) 10 mg tablet Take 10 mg by mouth once daily.    glucosamine-chondroitin 500-400 mg tablet Take 1 tablet by mouth 3 (three) times daily.    rosuvastatin (CRESTOR) 10 MG tablet Take 10 mg by mouth once daily.    aspirin (ECOTRIN) 81 MG EC tablet Take 81 mg by mouth once daily.    loratadine (CLARITIN) 10 mg tablet Take 10 mg by mouth once  daily.    potassium bicarbonate disintegrating tablet Take by mouth 2 (two) times daily.     Family History       Problem Relation (Age of Onset)    Breast cancer Maternal Aunt, Paternal Aunt          Tobacco Use    Smoking status: Every Day     Packs/day: 1.00     Years: 50.00     Pack years: 50.00     Types: Cigarettes    Smokeless tobacco: Never   Substance and Sexual Activity    Alcohol use: Yes     Alcohol/week: 6.0 standard drinks     Types: 6 Cans of beer per week     Comment: 6 beers daily    Drug use: Never    Sexual activity: Not on file     Review of Systems   Constitutional:  Negative for fatigue and fever.   HENT:  Positive for sinus pressure and sinus pain.         Post-op sinus surgery   Eyes:  Negative for visual disturbance.   Respiratory:  Negative for shortness of breath.    Cardiovascular:  Positive for chest pain. Negative for leg swelling.   Gastrointestinal:  Negative for abdominal pain.   Endocrine: Negative.    Genitourinary: Negative.    Musculoskeletal: Negative.    Skin: Negative.    Allergic/Immunologic: Negative.    Neurological: Negative.    Hematological: Negative.    Psychiatric/Behavioral: Negative.     Objective:     Vital Signs (Most Recent):  Temp: 97.2 °F (36.2 °C) (09/16/22 1240)  Pulse: 99 (09/16/22 1509)  Resp: 18 (09/16/22 1509)  BP: (!) 108/59 (09/16/22 1509)  SpO2: (!) 94 % (09/16/22 1509)   Vital Signs (24h Range):  Temp:  [97.2 °F (36.2 °C)-98.1 °F (36.7 °C)] 97.2 °F (36.2 °C)  Pulse:  [] 99  Resp:  [9-28] 18  SpO2:  [94 %-100 %] 94 %  BP: ()/() 108/59     Weight: 79.4 kg (175 lb)  Body mass index is 34.18 kg/m².    Physical Exam  Vitals reviewed.   Constitutional:       General: She is not in acute distress.     Appearance: She is not toxic-appearing or diaphoretic.   HENT:      Head: Normocephalic and atraumatic.      Right Ear: External ear normal.      Left Ear: External ear normal.      Nose: Nose normal.      Mouth/Throat:      Mouth: Mucous  membranes are moist.   Cardiovascular:      Rate and Rhythm: Normal rate and regular rhythm.      Pulses: Normal pulses.      Heart sounds: No murmur heard.  Pulmonary:      Effort: Pulmonary effort is normal. No respiratory distress.   Abdominal:      General: There is no distension.      Palpations: Abdomen is soft.   Musculoskeletal:      Right lower leg: No edema.      Left lower leg: No edema.   Skin:     General: Skin is warm and dry.   Neurological:      General: No focal deficit present.      Mental Status: She is alert.   Psychiatric:      Comments: Anxious mood and behavior           Significant Labs: All pertinent labs within the past 24 hours have been reviewed.  Troponin:   Recent Labs   Lab 09/16/22  1407   TROPONINI <0.006       Significant Imaging: I have reviewed all pertinent imaging results/findings within the past 24 hours.      Assessment/Plan:     * Chest pain  Substernal radiating to left arm   Trend troponin, initial negative with no EKG changes  EKG prn chest pain  Continuous cardiac monitoring   SL nitro prn chest pain        Deviated nasal septum  Advance diet as tolerated   Elevate head of bed   Continue precautions        VTE Risk Mitigation (From admission, onward)         Ordered     heparin (porcine) injection 5,000 Units  Every 8 hours         09/16/22 1601     IP VTE HIGH RISK PATIENT  Once         09/16/22 1601     Place sequential compression device  Until discontinued         09/16/22 1601                   Carlie Camarillo MD  Department of Hospital Medicine   [facility]

## 2022-09-16 NOTE — PLAN OF CARE
1425 - Pt c/o of chest pain has improved from a 9/10 to 7/10 after first dose of nitro sublingual    1426 - 2nd dose of nitro given.     1432 -  pt still rates pain at 7/10 but isn't quite as bad.

## 2022-09-16 NOTE — PLAN OF CARE
09/16/22 1615   CHURCH Message   Medicare Outpatient and Observation Notification regarding financial responsibility Given to patient/caregiver;Explained to patient/caregiver   Date CHURCH was signed 09/16/22   Time CHURCH was signed 1600

## 2022-09-16 NOTE — DISCHARGE SUMMARY
[facility] - Surgery    Discharge Note        SUMMARY     Admit Date: 9/16/2022    Attending Physician: Nabeel Fernandez MD     Discharge Physician: Nabeel Fernandez MD    Discharge Date: 9/16/2022 11:43 AM      Hospital Course: Patient tolerated procedure well.     Disposition: Home or Self Care    Patient Instructions:   Current Discharge Medication List        CONTINUE these medications which have NOT CHANGED    Details   doxepin (SINEQUAN) 10 MG capsule Take 10 mg by mouth nightly as needed.      ezetimibe (ZETIA) 10 mg tablet Take 10 mg by mouth once daily.      glucosamine-chondroitin 500-400 mg tablet Take 1 tablet by mouth 3 (three) times daily.      rosuvastatin (CRESTOR) 10 MG tablet Take 10 mg by mouth once daily.      aspirin (ECOTRIN) 81 MG EC tablet Take 81 mg by mouth once daily.      loratadine (CLARITIN) 10 mg tablet Take 10 mg by mouth once daily.      potassium bicarbonate disintegrating tablet Take by mouth 2 (two) times daily.             Discharge Procedure Orders (must include Diet, Follow-up, Activity):  No discharge procedures on file.     Follow Up:  Follow up as scheduled.  Resume routine diet.  Activity as tolerated.

## 2022-09-16 NOTE — PLAN OF CARE
09/16/22 1604   Discharge Assessment   Assessment Type Discharge Planning Assessment   Confirmed/corrected address, phone number and insurance Yes   Confirmed Demographics Correct on Facesheet   Source of Information patient   When was your last doctors appointment? 09/14/22  ([facility])   Does patient/caregiver understand observation status Yes   Communicated NICK with patient/caregiver Date not available/Unable to determine   Reason For Admission Chest pain post procedure   Lives With spouse   Facility Arrived From: Home   Do you expect to return to your current living situation? Yes   Do you have help at home or someone to help you manage your care at home? Yes   Who are your caregiver(s) and their phone number(s)? Spouse - Ryan Cross [phone]   Prior to hospitilization cognitive status: Alert/Oriented   Current cognitive status: Alert/Oriented   Walking or Climbing Stairs Difficulty none   Dressing/Bathing Difficulty none   Home Accessibility wheelchair accessible   Home Layout Able to live on 1st floor   Equipment Currently Used at Home cane, quad;cane, straight;nebulizer;oxygen;rollator;other (see comments)  (The patient does not use but has available if needed from family membres that was hand me downs.)   Readmission within 30 days? No   Patient currently being followed by outpatient case management? No   Do you currently have service(s) that help you manage your care at home? No   Do you take prescription medications? Yes   Do you have prescription coverage? Yes   Coverage [employer]   Do you have any problems affording any of your prescribed medications? TBD   Is the patient taking medications as prescribed? yes   Who is going to help you get home at discharge? Spouse - Ryan Cross [phone]   How do you get to doctors appointments? car, drives self;family or friend will provide   Are you on dialysis? No   Do you take coumadin? No   Discharge Plan A Home with family   Discharge Plan B Home with family    DME Needed Upon Discharge  none   Discharge Plan discussed with: Patient   Discharge Barriers Identified None   Physical Activity   On average, how many days per week do you engage in moderate to strenuous exercise (like a brisk walk)? 7 days   On average, how many minutes do you engage in exercise at this level? 20 min   Financial Resource Strain   How hard is it for you to pay for the very basics like food, housing, medical care, and heating? Somewhat   Housing Stability   In the last 12 months, was there a time when you were not able to pay the mortgage or rent on time? N   In the last 12 months, how many places have you lived? 1   In the last 12 months, was there a time when you did not have a steady place to sleep or slept in a shelter (including now)? N   Transportation Needs   In the past 12 months, has lack of transportation kept you from medical appointments or from getting medications? no   In the past 12 months, has lack of transportation kept you from meetings, work, or from getting things needed for daily living? No   Food Insecurity   Within the past 12 months, you worried that your food would run out before you got the money to buy more. Often true   Within the past 12 months, the food you bought just didn't last and you didn't have money to get more. Never true   Stress   Do you feel stress - tense, restless, nervous, or anxious, or unable to sleep at night because your mind is troubled all the time - these days? Rather much   Social Connections   In a typical week, how many times do you talk on the phone with family, friends, or neighbors? More than 3   How often do you get together with friends or relatives? Never   How often do you attend Christianity or Sikhism services? Never   Do you belong to any clubs or organizations such as Christianity groups, unions, fraternal or athletic groups, or school groups? No   How often do you attend meetings of the clubs or organizations you belong to? Never   Are you  ", , , , never , or living with a partner?    Alcohol Use   Q1: How often do you have a drink containing alcohol? 4 or more ti  (Depends on mood)   Q2: How many drinks containing alcohol do you have on a typical day when you are drinking? 5 or 6  ("Depends on my mood")   Q3: How often do you have six or more drinks on one occasion?   ("Could be monthly on daily, depends on my mood")   Relationship/Environment   Name(s) of Who Lives With Patient Spouse - Ryan Cross [phone]       Assessment done with pleasant patient. Patient lives at home with Spouse - Ryan Cross [phone]. Patient states she has several pieces of DME at home that was handed down to her, however, she is not currently using.  She denies having use of home health services currently. Patient denies any needs at this time. Case Management will continue to follow for further needs.  "

## 2022-09-16 NOTE — ANESTHESIA POSTPROCEDURE EVALUATION
Anesthesia Post Evaluation    Patient: Cyndi Cross    Procedure(s) Performed: Procedure(s) (LRB):  SEPTOPLASTY, NOSE (N/A)  EXCISION, NASAL TURBINATE (N/A)  ENDOSCOPY, NOSE (N/A)  FESS (FUNCTIONAL ENDOSCOPIC SINUS SURGERY) (N/A)    Final Anesthesia Type: general      Patient location during evaluation: PACU  Patient participation: Yes- Able to Participate  Level of consciousness: awake and awake and alert  Post-procedure vital signs: reviewed and stable  Pain management: adequate  Airway patency: patent    PONV status at discharge: No PONV  Anesthetic complications: no      Cardiovascular status: blood pressure returned to baseline  Respiratory status: unassisted and spontaneous ventilation  Hydration status: euvolemic  Follow-up not needed.          Vitals Value Taken Time   /71 09/16/22 1402   Temp 36.2 °C (97.2 °F) 09/16/22 1240   Pulse 90 09/16/22 1420   Resp 21 09/16/22 1420   SpO2 98 % 09/16/22 1420   Vitals shown include unvalidated device data.      No case tracking events are documented in the log.      Pain/Irving Score: Pain Rating Prior to Med Admin: 9 (9/16/2022  1:49 PM)

## 2022-09-17 VITALS
HEART RATE: 69 BPM | OXYGEN SATURATION: 99 % | RESPIRATION RATE: 18 BRPM | TEMPERATURE: 98 F | BODY MASS INDEX: 35.67 KG/M2 | DIASTOLIC BLOOD PRESSURE: 65 MMHG | SYSTOLIC BLOOD PRESSURE: 157 MMHG | WEIGHT: 181.69 LBS | HEIGHT: 60 IN

## 2022-09-17 PROCEDURE — 25000003 PHARM REV CODE 250: Performed by: FAMILY MEDICINE

## 2022-09-17 PROCEDURE — 94761 N-INVAS EAR/PLS OXIMETRY MLT: CPT

## 2022-09-17 PROCEDURE — 96372 THER/PROPH/DIAG INJ SC/IM: CPT | Performed by: FAMILY MEDICINE

## 2022-09-17 PROCEDURE — G0378 HOSPITAL OBSERVATION PER HR: HCPCS

## 2022-09-17 PROCEDURE — 63600175 PHARM REV CODE 636 W HCPCS: Performed by: FAMILY MEDICINE

## 2022-09-17 RX ORDER — METHOCARBAMOL 500 MG/1
500 TABLET, FILM COATED ORAL 4 TIMES DAILY PRN
Qty: 40 TABLET | Refills: 0 | Status: SHIPPED | OUTPATIENT
Start: 2022-09-17 | End: 2022-09-27

## 2022-09-17 RX ADMIN — DIAZEPAM 5 MG: 5 TABLET ORAL at 08:09

## 2022-09-17 RX ADMIN — PROMETHAZINE HYDROCHLORIDE 25 MG: 12.5 TABLET ORAL at 08:09

## 2022-09-17 RX ADMIN — PROMETHAZINE HYDROCHLORIDE 25 MG: 12.5 TABLET ORAL at 02:09

## 2022-09-17 RX ADMIN — HYDROCODONE BITARTRATE AND ACETAMINOPHEN 1 TABLET: 5; 325 TABLET ORAL at 02:09

## 2022-09-17 RX ADMIN — HEPARIN SODIUM 5000 UNITS: 5000 INJECTION, SOLUTION INTRAVENOUS; SUBCUTANEOUS at 08:09

## 2022-09-17 RX ADMIN — HYDROCODONE BITARTRATE AND ACETAMINOPHEN 1 TABLET: 5; 325 TABLET ORAL at 08:09

## 2022-09-17 NOTE — NURSING
Patient AAOx4. Respirations even and unlabored. No distress noted. Discharge instructions reviewed with the patient and verbalizes understanding. New prescriptions and medication changes reviewed with the patient, verbalizes understanding. Patient transported to personal vehicle via wheelchair to be discharged home with family. All personal belongings sent with the patient.

## 2022-09-17 NOTE — PLAN OF CARE
Problem: Adult Inpatient Plan of Care  Goal: Plan of Care Review  Outcome: Met     Problem: Adult Inpatient Plan of Care  Goal: Patient-Specific Goal (Individualized)  Outcome: Met     Problem: Adult Inpatient Plan of Care  Goal: Absence of Hospital-Acquired Illness or Injury  Outcome: Met     Problem: Adult Inpatient Plan of Care  Goal: Optimal Comfort and Wellbeing  Outcome: Met     Problem: Adult Inpatient Plan of Care  Goal: Readiness for Transition of Care  Outcome: Met     Problem: Fall Injury Risk  Goal: Absence of Fall and Fall-Related Injury  Outcome: Met

## 2022-09-18 LAB — BACTERIA SPEC AEROBE CULT: ABNORMAL

## 2022-09-19 NOTE — PLAN OF CARE
09/19/22 1647   Final Note   Assessment Type Final Discharge Note   Anticipated Discharge Disposition Home   What phone number can be called within the next 1-3 days to see how you are doing after discharge? [phone]   Called patient with follow up with Nora Morocho with [facility] for Wed. She saw Dr Fernandez today & sees him again on Monday. Denies any needs at this time.

## 2022-09-20 LAB
BACTERIA SPEC ANAEROBE CULT: ABNORMAL
BACTERIA SPEC ANAEROBE CULT: ABNORMAL

## 2022-09-21 NOTE — DISCHARGE SUMMARY
[facility]    Discharge Note        SUMMARY     Admit Date: 9/16/2022    Attending Physician: No att. providers found     Discharge Physician: No att. providers found    Discharge Date: 9/21/2022 7:39 AM      Hospital Course: Patient tolerated procedure well.     Disposition: Home or Self Care    Patient Instructions:   Discharge Medication List as of 9/17/2022  9:43 AM        START taking these medications    Details   methocarbamoL (ROBAXIN) 500 MG Tab Take 1 tablet (500 mg total) by mouth 4 (four) times daily as needed (muscle spasm)., Starting Sat 9/17/2022, Until Tue 9/27/2022 at 2359, Normal           CONTINUE these medications which have NOT CHANGED    Details   aspirin (ECOTRIN) 81 MG EC tablet Take 81 mg by mouth once daily., Historical Med      doxepin (SINEQUAN) 10 MG capsule Take 10 mg by mouth nightly as needed., Historical Med      ezetimibe (ZETIA) 10 mg tablet Take 10 mg by mouth once daily., Historical Med      glucosamine-chondroitin 500-400 mg tablet Take 1 tablet by mouth 3 (three) times daily., Historical Med      loratadine (CLARITIN) 10 mg tablet Take 10 mg by mouth once daily., Historical Med      potassium bicarbonate disintegrating tablet Take by mouth 2 (two) times daily., Historical Med      rosuvastatin (CRESTOR) 10 MG tablet Take 10 mg by mouth once daily., Historical Med             Discharge Procedure Orders (must include Diet, Follow-up, Activity):   Discharge Procedure Orders (must include Diet, Follow-up, Activity)   Diet Adult Regular     Notify your health care provider if you experience any of the following:  severe uncontrolled pain        Follow Up:  Follow up as scheduled.  Resume routine diet.  Activity as tolerated.

## 2022-09-22 LAB
FINAL PATHOLOGIC DIAGNOSIS: NORMAL
GROSS: NORMAL
Lab: NORMAL
MICROSCOPIC EXAM: NORMAL

## 2022-10-18 LAB — FUNGUS SPEC CULT: NORMAL

## 2023-02-07 ENCOUNTER — HOSPITAL ENCOUNTER (OUTPATIENT)
Dept: RADIOLOGY | Facility: HOSPITAL | Age: 68
Discharge: HOME | End: 2023-02-07
Attending: NURSE PRACTITIONER
Payer: MEDICARE

## 2023-02-07 DIAGNOSIS — R91.1 SOLITARY PULMONARY NODULE: ICD-10-CM

## 2023-02-07 PROCEDURE — 71250 CT THORAX DX C-: CPT | Mod: 26,,, | Performed by: RADIOLOGY

## 2023-02-07 PROCEDURE — 71250 CT CHEST WITHOUT CONTRAST: ICD-10-PCS | Mod: 26,,, | Performed by: RADIOLOGY

## 2023-02-07 PROCEDURE — 71250 CT THORAX DX C-: CPT | Mod: TC

## 2023-02-09 ENCOUNTER — HOSPITAL ENCOUNTER (OUTPATIENT)
Dept: RADIOLOGY | Facility: HOSPITAL | Age: 68
Discharge: HOME | End: 2023-02-09
Attending: NURSE PRACTITIONER
Payer: MEDICARE

## 2023-02-09 DIAGNOSIS — N63.20 BILATERAL BREAST LUMP: ICD-10-CM

## 2023-02-09 DIAGNOSIS — N63.10 BILATERAL BREAST LUMP: ICD-10-CM

## 2023-02-09 DIAGNOSIS — N63.0 PALPABLE MASS OF BREAST: ICD-10-CM

## 2023-02-09 PROCEDURE — 76642 US BREAST LEFT LIMITED: ICD-10-PCS | Mod: 26,LT,, | Performed by: RADIOLOGY

## 2023-02-09 PROCEDURE — 77066 DX MAMMO INCL CAD BI: CPT | Mod: 26,,, | Performed by: RADIOLOGY

## 2023-02-09 PROCEDURE — 77066 MAMMO DIGITAL DIAGNOSTIC BILAT WITH TOMO: ICD-10-PCS | Mod: 26,,, | Performed by: RADIOLOGY

## 2023-02-09 PROCEDURE — 76642 ULTRASOUND BREAST LIMITED: CPT | Mod: 26,LT,, | Performed by: RADIOLOGY

## 2023-02-09 PROCEDURE — 77062 BREAST TOMOSYNTHESIS BI: CPT | Mod: 26,,, | Performed by: RADIOLOGY

## 2023-02-09 PROCEDURE — 77066 DX MAMMO INCL CAD BI: CPT | Mod: TC

## 2023-02-09 PROCEDURE — 77062 MAMMO DIGITAL DIAGNOSTIC BILAT WITH TOMO: ICD-10-PCS | Mod: 26,,, | Performed by: RADIOLOGY

## 2023-02-09 PROCEDURE — 76642 ULTRASOUND BREAST LIMITED: CPT | Mod: TC,LT

## 2023-03-07 ENCOUNTER — HOSPITAL ENCOUNTER (OUTPATIENT)
Dept: PREADMISSION TESTING | Facility: HOSPITAL | Age: 68
Discharge: HOME | End: 2023-03-07
Payer: MEDICARE

## 2023-03-07 ENCOUNTER — HOSPITAL ENCOUNTER (OUTPATIENT)
Dept: RADIOLOGY | Facility: HOSPITAL | Age: 68
Discharge: HOME | End: 2023-03-07
Payer: MEDICARE

## 2023-03-07 ENCOUNTER — HOSPITAL ENCOUNTER (OUTPATIENT)
Dept: CARDIOLOGY | Facility: HOSPITAL | Age: 68
Discharge: HOME | End: 2023-03-07
Payer: MEDICARE

## 2023-03-07 ENCOUNTER — ANESTHESIA EVENT (OUTPATIENT)
Dept: SURGERY | Facility: HOSPITAL | Age: 68
End: 2023-03-07
Payer: MEDICARE

## 2023-03-07 DIAGNOSIS — J38.2 VOCAL CORD NODULES: ICD-10-CM

## 2023-03-07 DIAGNOSIS — J38.2 VOCAL CORD NODULES: Primary | ICD-10-CM

## 2023-03-07 PROCEDURE — 93010 EKG 12-LEAD: ICD-10-PCS | Mod: ,,, | Performed by: INTERNAL MEDICINE

## 2023-03-07 PROCEDURE — 93010 ELECTROCARDIOGRAM REPORT: CPT | Mod: ,,, | Performed by: INTERNAL MEDICINE

## 2023-03-07 PROCEDURE — 71046 X-RAY EXAM CHEST 2 VIEWS: CPT | Mod: 26,,, | Performed by: RADIOLOGY

## 2023-03-07 PROCEDURE — 71046 X-RAY EXAM CHEST 2 VIEWS: CPT | Mod: TC

## 2023-03-07 PROCEDURE — 93005 ELECTROCARDIOGRAM TRACING: CPT

## 2023-03-07 PROCEDURE — 71046 XR CHEST PA AND LATERAL: ICD-10-PCS | Mod: 26,,, | Performed by: RADIOLOGY

## 2023-03-07 RX ORDER — CLONAZEPAM 1 MG/1
1 TABLET ORAL 2 TIMES DAILY PRN
COMMUNITY

## 2023-03-07 NOTE — ANESTHESIA PREPROCEDURE EVALUATION
03/07/2023  Cyndi Cross is a 67 y.o., female.      Pre-op Assessment    I have reviewed the Patient Summary Reports.     I have reviewed the Nursing Notes.    I have reviewed the Medications.     Review of Systems  Anesthesia Hx:  No problems with previous Anesthesia  Neg history of prior surgery. Denies Family Hx of Anesthesia complications.   Denies Personal Hx of Anesthesia complications.   Social:  Smoker    Hematology/Oncology:  Hematology Normal   Oncology Normal     EENT/Dental:EENT/Dental Normal   Cardiovascular:  Cardiovascular Normal     Pulmonary:   COPD, moderate On home O2   Renal/:  Renal/ Normal     Hepatic/GI:  Hepatic/GI Normal    Musculoskeletal:  Musculoskeletal Normal    Neurological:  Neurology Normal    Endocrine:  Endocrine Normal    Dermatological:  Skin Normal    Psych:   Psychiatric History anxiety depression          Physical Exam  General: Alert    Airway:  Mallampati: II   Mouth Opening: Normal  TM Distance: Normal  Neck ROM: Normal ROM    Dental:  Dentures    Chest/Lungs:  Clear to auscultation, Normal Respiratory Rate    Heart:  Rate: Normal    Abdomen:  Normal        Anesthesia Plan  Type of Anesthesia, risks & benefits discussed:    Anesthesia Type: Gen ETT  Post Op Pain Control Plan: multimodal analgesia  Airway Plan: Direct, Post-Induction  Informed Consent: Informed consent signed with the Patient and all parties understand the risks and agree with anesthesia plan.  All questions answered. Patient consented to blood products? No  ASA Score: 3  Day of Surgery Review of History & Physical: H&P Update referred to the surgeon/provider.    Ready For Surgery From Anesthesia Perspective.     .

## 2023-03-08 ENCOUNTER — ANESTHESIA (OUTPATIENT)
Dept: SURGERY | Facility: HOSPITAL | Age: 68
End: 2023-03-08
Payer: MEDICARE

## 2023-03-08 ENCOUNTER — HOSPITAL ENCOUNTER (OUTPATIENT)
Facility: HOSPITAL | Age: 68
Discharge: HOME | End: 2023-03-08
Attending: OTOLARYNGOLOGY | Admitting: OTOLARYNGOLOGY
Payer: MEDICARE

## 2023-03-08 VITALS
HEART RATE: 62 BPM | HEIGHT: 60 IN | BODY MASS INDEX: 34.55 KG/M2 | OXYGEN SATURATION: 95 % | SYSTOLIC BLOOD PRESSURE: 123 MMHG | RESPIRATION RATE: 20 BRPM | DIASTOLIC BLOOD PRESSURE: 62 MMHG | WEIGHT: 176 LBS | TEMPERATURE: 97 F

## 2023-03-08 DIAGNOSIS — J44.89 CHRONIC BRONCHITIS WITH COPD (CHRONIC OBSTRUCTIVE PULMONARY DISEASE): ICD-10-CM

## 2023-03-08 DIAGNOSIS — J38.3 POLYPOID DEGENERATION OF VOCAL CORDS: ICD-10-CM

## 2023-03-08 DIAGNOSIS — K21.00 CHRONIC REFLUX ESOPHAGITIS: Primary | ICD-10-CM

## 2023-03-08 PROCEDURE — 71000015 HC POSTOP RECOV 1ST HR: Performed by: OTOLARYNGOLOGY

## 2023-03-08 PROCEDURE — 63600175 PHARM REV CODE 636 W HCPCS: Performed by: NURSE ANESTHETIST, CERTIFIED REGISTERED

## 2023-03-08 PROCEDURE — 37000008 HC ANESTHESIA 1ST 15 MINUTES: Performed by: OTOLARYNGOLOGY

## 2023-03-08 PROCEDURE — 71000033 HC RECOVERY, INTIAL HOUR: Performed by: OTOLARYNGOLOGY

## 2023-03-08 PROCEDURE — 36000709 HC OR TIME LEV III EA ADD 15 MIN: Performed by: OTOLARYNGOLOGY

## 2023-03-08 PROCEDURE — 37000009 HC ANESTHESIA EA ADD 15 MINS: Performed by: OTOLARYNGOLOGY

## 2023-03-08 PROCEDURE — 36000708 HC OR TIME LEV III 1ST 15 MIN: Performed by: OTOLARYNGOLOGY

## 2023-03-08 PROCEDURE — D9220A PRA ANESTHESIA: ICD-10-PCS | Mod: ANES,,, | Performed by: ANESTHESIOLOGY

## 2023-03-08 PROCEDURE — D9220A PRA ANESTHESIA: ICD-10-PCS | Mod: CRNA,,, | Performed by: NURSE ANESTHETIST, CERTIFIED REGISTERED

## 2023-03-08 PROCEDURE — 27201423 OPTIME MED/SURG SUP & DEVICES STERILE SUPPLY: Performed by: OTOLARYNGOLOGY

## 2023-03-08 PROCEDURE — D9220A PRA ANESTHESIA: Mod: CRNA,,, | Performed by: NURSE ANESTHETIST, CERTIFIED REGISTERED

## 2023-03-08 PROCEDURE — D9220A PRA ANESTHESIA: Mod: ANES,,, | Performed by: ANESTHESIOLOGY

## 2023-03-08 PROCEDURE — 25000003 PHARM REV CODE 250: Performed by: NURSE ANESTHETIST, CERTIFIED REGISTERED

## 2023-03-08 RX ORDER — ROCURONIUM BROMIDE 10 MG/ML
INJECTION, SOLUTION INTRAVENOUS
Status: DISCONTINUED | OUTPATIENT
Start: 2023-03-08 | End: 2023-03-08

## 2023-03-08 RX ORDER — PROPOFOL 10 MG/ML
VIAL (ML) INTRAVENOUS
Status: DISCONTINUED | OUTPATIENT
Start: 2023-03-08 | End: 2023-03-08

## 2023-03-08 RX ORDER — ONDANSETRON 2 MG/ML
4 INJECTION INTRAMUSCULAR; INTRAVENOUS DAILY PRN
Status: DISCONTINUED | OUTPATIENT
Start: 2023-03-08 | End: 2023-03-08 | Stop reason: HOSPADM

## 2023-03-08 RX ORDER — ONDANSETRON 2 MG/ML
INJECTION INTRAMUSCULAR; INTRAVENOUS
Status: DISCONTINUED | OUTPATIENT
Start: 2023-03-08 | End: 2023-03-08

## 2023-03-08 RX ORDER — FENTANYL CITRATE 50 UG/ML
INJECTION, SOLUTION INTRAMUSCULAR; INTRAVENOUS
Status: DISCONTINUED | OUTPATIENT
Start: 2023-03-08 | End: 2023-03-08

## 2023-03-08 RX ORDER — SODIUM CHLORIDE, SODIUM LACTATE, POTASSIUM CHLORIDE, CALCIUM CHLORIDE 600; 310; 30; 20 MG/100ML; MG/100ML; MG/100ML; MG/100ML
125 INJECTION, SOLUTION INTRAVENOUS CONTINUOUS
Status: DISCONTINUED | OUTPATIENT
Start: 2023-03-08 | End: 2023-03-08 | Stop reason: HOSPADM

## 2023-03-08 RX ORDER — SODIUM CHLORIDE, SODIUM LACTATE, POTASSIUM CHLORIDE, CALCIUM CHLORIDE 600; 310; 30; 20 MG/100ML; MG/100ML; MG/100ML; MG/100ML
INJECTION, SOLUTION INTRAVENOUS CONTINUOUS PRN
Status: DISCONTINUED | OUTPATIENT
Start: 2023-03-08 | End: 2023-03-08

## 2023-03-08 RX ORDER — MIDAZOLAM HYDROCHLORIDE 1 MG/ML
INJECTION INTRAMUSCULAR; INTRAVENOUS
Status: DISCONTINUED | OUTPATIENT
Start: 2023-03-08 | End: 2023-03-08

## 2023-03-08 RX ORDER — LIDOCAINE HYDROCHLORIDE 20 MG/ML
INJECTION, SOLUTION EPIDURAL; INFILTRATION; INTRACAUDAL; PERINEURAL
Status: DISCONTINUED | OUTPATIENT
Start: 2023-03-08 | End: 2023-03-08

## 2023-03-08 RX ORDER — DEXAMETHASONE SODIUM PHOSPHATE 4 MG/ML
INJECTION, SOLUTION INTRA-ARTICULAR; INTRALESIONAL; INTRAMUSCULAR; INTRAVENOUS; SOFT TISSUE
Status: DISCONTINUED | OUTPATIENT
Start: 2023-03-08 | End: 2023-03-08

## 2023-03-08 RX ORDER — SODIUM CHLORIDE, SODIUM LACTATE, POTASSIUM CHLORIDE, CALCIUM CHLORIDE 600; 310; 30; 20 MG/100ML; MG/100ML; MG/100ML; MG/100ML
INJECTION, SOLUTION INTRAVENOUS CONTINUOUS
Status: DISCONTINUED | OUTPATIENT
Start: 2023-03-08 | End: 2023-03-08 | Stop reason: HOSPADM

## 2023-03-08 RX ORDER — LIDOCAINE HYDROCHLORIDE 10 MG/ML
1 INJECTION, SOLUTION EPIDURAL; INFILTRATION; INTRACAUDAL; PERINEURAL ONCE
Status: DISCONTINUED | OUTPATIENT
Start: 2023-03-08 | End: 2023-03-08 | Stop reason: HOSPADM

## 2023-03-08 RX ORDER — SUCCINYLCHOLINE CHLORIDE 20 MG/ML
INJECTION INTRAMUSCULAR; INTRAVENOUS
Status: DISCONTINUED | OUTPATIENT
Start: 2023-03-08 | End: 2023-03-08

## 2023-03-08 RX ADMIN — FENTANYL CITRATE 100 MCG: 50 INJECTION, SOLUTION INTRAMUSCULAR; INTRAVENOUS at 08:03

## 2023-03-08 RX ADMIN — SODIUM CHLORIDE, POTASSIUM CHLORIDE, SODIUM LACTATE AND CALCIUM CHLORIDE: 600; 310; 30; 20 INJECTION, SOLUTION INTRAVENOUS at 07:03

## 2023-03-08 RX ADMIN — DEXAMETHASONE SODIUM PHOSPHATE 4 MG: 4 INJECTION, SOLUTION INTRAMUSCULAR; INTRAVENOUS at 08:03

## 2023-03-08 RX ADMIN — LIDOCAINE HYDROCHLORIDE 40 MG: 20 INJECTION, SOLUTION EPIDURAL; INFILTRATION; INTRACAUDAL; PERINEURAL at 08:03

## 2023-03-08 RX ADMIN — PROPOFOL 200 MG: 10 INJECTION, EMULSION INTRAVENOUS at 08:03

## 2023-03-08 RX ADMIN — SUGAMMADEX 200 MG: 100 INJECTION, SOLUTION INTRAVENOUS at 08:03

## 2023-03-08 RX ADMIN — ROCURONIUM BROMIDE 30 MG: 10 INJECTION, SOLUTION INTRAVENOUS at 08:03

## 2023-03-08 RX ADMIN — SUCCINYLCHOLINE CHLORIDE 150 MG: 20 INJECTION, SOLUTION INTRAMUSCULAR; INTRAVENOUS at 08:03

## 2023-03-08 RX ADMIN — ONDANSETRON 4 MG: 2 INJECTION INTRAMUSCULAR; INTRAVENOUS at 08:03

## 2023-03-08 RX ADMIN — ROCURONIUM BROMIDE 20 MG: 10 INJECTION, SOLUTION INTRAVENOUS at 08:03

## 2023-03-08 RX ADMIN — MIDAZOLAM HYDROCHLORIDE 5 MG: 1 INJECTION, SOLUTION INTRAMUSCULAR; INTRAVENOUS at 07:03

## 2023-03-08 NOTE — BRIEF OP NOTE
[facility] - [facility]  Brief Operative Note     SUMMARY     Surgery Date: 3/8/2023     Surgeon(s) and Role:     * Nabeel Fernandez MD - Primary        Pre-op Diagnosis:  Hoarseness [R49.0]  Vocal cord nodule [J38.2]    Post-op Diagnosis:  Post-Op Diagnosis Codes:     * Hoarseness [R49.0]     * Vocal cord nodule [J38.2]    Procedure(s) (LRB):  LARYNGOSCOPY, DIRECT, DIAGNOSTIC, WITH BRONCHOSCOPY AND ESOPHAGOSCOPY (N/A)  EXCISION, NODULE, VOCAL CORD (N/A)      Description of the findings of the procedure:  Hoarseness [R49.0]  Vocal cord nodule [J38.2]      Estimated Blood Loss: * No values recorded between 3/8/2023  8:11 AM and 3/8/2023  8:54 AM *

## 2023-03-08 NOTE — DISCHARGE SUMMARY
[facility]    Discharge Note        SUMMARY     Admit Date: 3/8/2023    Attending Physician: Nabeel Fernandez MD     Discharge Physician: Nabeel Fernandez MD    Discharge Date: 3/8/2023 9:00 AM      Hospital Course: Patient tolerated procedure well.     Disposition: Home or Self Care    Patient Instructions:   Current Discharge Medication List        CONTINUE these medications which have NOT CHANGED    Details   aspirin (ECOTRIN) 81 MG EC tablet Take 81 mg by mouth once daily.      clonazePAM (KLONOPIN) 1 MG tablet Take 1 mg by mouth 2 (two) times daily as needed for Anxiety.      doxepin (SINEQUAN) 10 MG capsule Take 10 mg by mouth nightly as needed.      ezetimibe (ZETIA) 10 mg tablet Take 10 mg by mouth once daily.      fluticasone/umeclidin/vilanter (TRELEGY ELLIPTA INHL) Inhale into the lungs.      glucosamine-chondroitin 500-400 mg tablet Take 1 tablet by mouth 3 (three) times daily.      loratadine (CLARITIN) 10 mg tablet Take 10 mg by mouth once daily.      potassium bicarbonate disintegrating tablet Take by mouth 2 (two) times daily.      rosuvastatin (CRESTOR) 10 MG tablet Take 10 mg by mouth once daily.             Discharge Procedure Orders (must include Diet, Follow-up, Activity):  No discharge procedures on file.     Follow Up:  Follow up as scheduled.  Resume routine diet.  Activity as tolerated.

## 2023-03-08 NOTE — OP NOTE
[facility], [place], operated by [facility]  Operative Note     SUMMARY     Surgery Date: 3/8/2023       Pre-op Diagnosis:  Hoarseness [R49.0]  Vocal cord nodule [J38.2]    Post-op Diagnosis:  Post-Op Diagnosis Codes:     * Hoarseness [R49.0]     * Vocal cord nodule [J38.2]    Procedure(s) (LRB):  LARYNGOSCOPY, DIRECT, DIAGNOSTIC, WITH BRONCHOSCOPY AND ESOPHAGOSCOPY (N/A)  EXCISION, NODULE, VOCAL CORD (N/A)    Surgeon(s) and Role:     * Nabeel Fernandez MD - Primary      Estimated Blood Loss: * No values recorded between 3/8/2023  8:11 AM and 3/8/2023  8:52 AM *    Anesthesia:  General    Description of the findings of the procedure:  Hoarseness [R49.0]  Vocal cord nodule [J38.2]           Procedure:  The patient was taken to the operating room and placed in spine position a general anesthetic was obtained with a 5-1/2 endotracheal tube.  A Dedo laryngoscope was taken and placed into the mouth maneuver down into the larynx and attached to a self-retaining retractor.  The vocal cords were reviewed.  They were markedly edematous and swollen due to years of cigarette smoking and vocal abuse.  The patient had a very hoarse voice.  No tumors were present.  A laryngeal debrider was taken and the excess mucosa was removed along with the subcutaneous edematous tissue.  The vocal cords were reduced in half and mass.  The vocal ligament was not damaged and the anterior commissure was spared this was done bilaterally.  The debrider was removed.  A bronchoscope was placed through the patient's endotracheal tube down into the lungs.  Both mainstem bronchi and all segmental and subsegmental bronchi on the left and the right were normal.  There was an excessive mucus present and this was suction.  The bronchoscope was removed.  Esophagus scope was taken placed in the patient's mouth into the hypopharynx into the esophagus into the stomach the pylorus and the body of the stomach were all normal there were no lesions present there was no gastric atrophy.   There were no lesions present.  The gastroscope was removed.  The patient tolerated procedure well was taken to the recovery room..  The inferior constrictor appeared to be functional.  The esophagus was reviewed as the gastroscope was removed

## 2023-03-08 NOTE — ANESTHESIA POSTPROCEDURE EVALUATION
Anesthesia Post Evaluation    Patient: Cyndi Cross    Procedure(s) Performed: Procedure(s) (LRB):  LARYNGOSCOPY, DIRECT, DIAGNOSTIC, WITH BRONCHOSCOPY AND ESOPHAGOSCOPY (N/A)  EXCISION, NODULE, VOCAL CORD (N/A)    Final Anesthesia Type: general      Patient location during evaluation: PACU  Patient participation: Yes- Able to Participate  Level of consciousness: awake and awake and alert  Post-procedure vital signs: reviewed and stable  Pain management: adequate  Airway patency: patent    PONV status at discharge: No PONV  Anesthetic complications: no      Cardiovascular status: blood pressure returned to baseline  Respiratory status: unassisted and spontaneous ventilation  Hydration status: euvolemic  Follow-up not needed.          Vitals Value Taken Time   /62 03/08/23 1003   Temp 36.3 °C (97.4 °F) 03/08/23 0858   Pulse 62 03/08/23 1005   Resp 20 03/08/23 1005   SpO2 95 % 03/08/23 1005         Event Time   Out of Recovery 09:53:02         Pain/Irving Score: Irving Score: 10 (3/8/2023  9:48 AM)  Modified Irving Score: 20 (3/8/2023 10:03 AM)

## 2023-03-08 NOTE — TRANSFER OF CARE
Anesthesia Transfer of Care Note    Patient: Cyndi Cross    Procedure(s) Performed: Procedure(s) (LRB):  LARYNGOSCOPY, DIRECT, DIAGNOSTIC, WITH BRONCHOSCOPY AND ESOPHAGOSCOPY (N/A)  EXCISION, NODULE, VOCAL CORD (N/A)    Patient location: PACU    Anesthesia Type: general    Transport from OR: Transported from OR on room air with adequate spontaneous ventilation    Post pain: adequate analgesia    Post assessment: no apparent anesthetic complications    Post vital signs: stable    Level of consciousness: sedated and responds to stimulation    Nausea/Vomiting: no nausea/vomiting    Complications: none    Transfer of care protocol was followed      Last vitals:   Visit Vitals  BP (!) 110/59   Pulse 66   Temp 36.6 °C (97.9 °F)   Resp 20   Ht 5' (1.524 m)   Wt 79.8 kg (176 lb)   SpO2 96%   BMI 34.37 kg/m²

## 2023-04-25 ENCOUNTER — APPOINTMENT (OUTPATIENT)
Dept: LAB | Facility: HOSPITAL | Age: 68
End: 2023-04-25
Attending: OTOLARYNGOLOGY
Payer: MEDICARE

## 2023-04-25 DIAGNOSIS — J32.4 CHRONIC PANSINUSITIS: Primary | ICD-10-CM

## 2023-04-25 PROCEDURE — 87070 CULTURE OTHR SPECIMN AEROBIC: CPT | Performed by: OTOLARYNGOLOGY

## 2023-04-25 PROCEDURE — 87076 CULTURE ANAEROBE IDENT EACH: CPT | Mod: 59 | Performed by: OTOLARYNGOLOGY

## 2023-04-25 PROCEDURE — 87075 CULTR BACTERIA EXCEPT BLOOD: CPT | Performed by: OTOLARYNGOLOGY

## 2023-04-25 PROCEDURE — 87102 FUNGUS ISOLATION CULTURE: CPT | Performed by: OTOLARYNGOLOGY

## 2023-04-27 LAB — BACTERIA SPEC AEROBE CULT: NORMAL

## 2023-05-01 LAB
BACTERIA SPEC ANAEROBE CULT: ABNORMAL
BACTERIA SPEC ANAEROBE CULT: ABNORMAL

## 2023-05-22 LAB — FUNGUS SPEC CULT: NORMAL

## 2024-04-04 ENCOUNTER — HOSPITAL ENCOUNTER (OUTPATIENT)
Dept: RADIOLOGY | Facility: HOSPITAL | Age: 69
Discharge: HOME | End: 2024-04-04
Attending: NURSE PRACTITIONER
Payer: MEDICARE

## 2024-04-04 DIAGNOSIS — Z78.0 MENOPAUSE: ICD-10-CM

## 2024-04-04 PROCEDURE — 77080 DXA BONE DENSITY AXIAL: CPT | Mod: TC

## 2024-04-04 PROCEDURE — 77080 DXA BONE DENSITY AXIAL: CPT | Mod: 26,,, | Performed by: RADIOLOGY

## 2024-08-27 ENCOUNTER — HOSPITAL ENCOUNTER (OUTPATIENT)
Dept: RADIOLOGY | Facility: HOSPITAL | Age: 69
Discharge: HOME | End: 2024-08-27
Attending: NURSE PRACTITIONER
Payer: MEDICARE

## 2024-08-27 DIAGNOSIS — R10.9 ABDOMINAL PAIN: ICD-10-CM

## 2024-08-27 PROCEDURE — 76700 US EXAM ABDOM COMPLETE: CPT | Mod: TC

## 2024-08-27 PROCEDURE — 76700 US EXAM ABDOM COMPLETE: CPT | Mod: 26,,, | Performed by: RADIOLOGY

## 2025-08-22 ENCOUNTER — ANESTHESIA EVENT (OUTPATIENT)
Dept: SURGERY | Facility: HOSPITAL | Age: 70
End: 2025-08-22
Payer: MEDICARE

## 2025-08-27 ENCOUNTER — HOSPITAL ENCOUNTER (OUTPATIENT)
Dept: RADIOLOGY | Facility: HOSPITAL | Age: 70
Discharge: HOME | End: 2025-08-27
Attending: OTOLARYNGOLOGY
Payer: MEDICARE

## 2025-08-27 ENCOUNTER — HOSPITAL ENCOUNTER (OUTPATIENT)
Dept: CARDIOLOGY | Facility: HOSPITAL | Age: 70
Discharge: HOME | End: 2025-08-27
Attending: OTOLARYNGOLOGY
Payer: MEDICARE

## 2025-08-27 ENCOUNTER — HOSPITAL ENCOUNTER (OUTPATIENT)
Dept: PREADMISSION TESTING | Facility: HOSPITAL | Age: 70
Discharge: HOME | End: 2025-08-27
Attending: OTOLARYNGOLOGY
Payer: MEDICARE

## 2025-08-27 VITALS
SYSTOLIC BLOOD PRESSURE: 126 MMHG | HEART RATE: 76 BPM | OXYGEN SATURATION: 96 % | DIASTOLIC BLOOD PRESSURE: 64 MMHG | TEMPERATURE: 98 F | BODY MASS INDEX: 32.98 KG/M2 | RESPIRATION RATE: 20 BRPM | WEIGHT: 168 LBS | HEIGHT: 60 IN

## 2025-08-27 DIAGNOSIS — D37.01 NEOPLASM OF UNCERTAIN BEHAVIOR OF LIP, ORAL CAVITY, AND PHARYNX: ICD-10-CM

## 2025-08-27 DIAGNOSIS — R49.8 OTHER VOICE AND RESONANCE DISORDERS: ICD-10-CM

## 2025-08-27 DIAGNOSIS — D37.09 NEOPLASM OF UNCERTAIN BEHAVIOR OF LIP, ORAL CAVITY, AND PHARYNX: ICD-10-CM

## 2025-08-27 DIAGNOSIS — R04.2 COUGHING UP BLOOD: ICD-10-CM

## 2025-08-27 DIAGNOSIS — D37.05 NEOPLASM OF UNCERTAIN BEHAVIOR OF LIP, ORAL CAVITY, AND PHARYNX: ICD-10-CM

## 2025-08-27 DIAGNOSIS — R13.12 OROPHARYNGEAL DYSPHAGIA: Primary | ICD-10-CM

## 2025-08-27 DIAGNOSIS — R13.12 OROPHARYNGEAL DYSPHAGIA: ICD-10-CM

## 2025-08-27 LAB
OHS QRS DURATION: 90 MS
OHS QTC CALCULATION: 424 MS

## 2025-08-27 PROCEDURE — 93010 ELECTROCARDIOGRAM REPORT: CPT | Mod: ,,, | Performed by: INTERNAL MEDICINE

## 2025-08-27 PROCEDURE — 71046 X-RAY EXAM CHEST 2 VIEWS: CPT | Mod: 26,,, | Performed by: RADIOLOGY

## 2025-08-27 PROCEDURE — 71046 X-RAY EXAM CHEST 2 VIEWS: CPT | Mod: TC

## 2025-08-27 PROCEDURE — 99900103 DSU ONLY-NO CHARGE-INITIAL HR (STAT)

## 2025-08-27 PROCEDURE — 93005 ELECTROCARDIOGRAM TRACING: CPT

## 2025-08-27 RX ORDER — MULTIVITAMIN WITH IRON
2 TABLET ORAL 2 TIMES DAILY
COMMUNITY

## 2025-08-29 ENCOUNTER — ANESTHESIA (OUTPATIENT)
Dept: SURGERY | Facility: HOSPITAL | Age: 70
End: 2025-08-29
Payer: MEDICARE

## 2025-08-29 PROBLEM — C05.9: Status: ACTIVE | Noted: 2025-08-29

## 2025-08-29 PROBLEM — R13.12 OROPHARYNGEAL DYSPHAGIA: Status: ACTIVE | Noted: 2025-08-29

## 2025-08-29 PROBLEM — K29.70 GASTRITIS: Status: ACTIVE | Noted: 2025-08-29

## 2025-08-29 PROCEDURE — 25000003 PHARM REV CODE 250: Performed by: NURSE ANESTHETIST, CERTIFIED REGISTERED

## 2025-08-29 PROCEDURE — 63600175 PHARM REV CODE 636 W HCPCS: Performed by: NURSE ANESTHETIST, CERTIFIED REGISTERED

## 2025-08-29 RX ORDER — LIDOCAINE HYDROCHLORIDE 20 MG/ML
INJECTION, SOLUTION EPIDURAL; INFILTRATION; INTRACAUDAL; PERINEURAL
Status: DISCONTINUED | OUTPATIENT
Start: 2025-08-29 | End: 2025-08-29

## 2025-08-29 RX ORDER — ROCURONIUM BROMIDE 10 MG/ML
INJECTION, SOLUTION INTRAVENOUS
Status: DISCONTINUED | OUTPATIENT
Start: 2025-08-29 | End: 2025-08-29

## 2025-08-29 RX ORDER — PROPOFOL 10 MG/ML
VIAL (ML) INTRAVENOUS
Status: DISCONTINUED | OUTPATIENT
Start: 2025-08-29 | End: 2025-08-29

## 2025-08-29 RX ORDER — DEXAMETHASONE SODIUM PHOSPHATE 4 MG/ML
INJECTION, SOLUTION INTRA-ARTICULAR; INTRALESIONAL; INTRAMUSCULAR; INTRAVENOUS; SOFT TISSUE
Status: DISCONTINUED | OUTPATIENT
Start: 2025-08-29 | End: 2025-08-29

## 2025-08-29 RX ORDER — ACETAMINOPHEN 10 MG/ML
INJECTION, SOLUTION INTRAVENOUS
Status: DISCONTINUED | OUTPATIENT
Start: 2025-08-29 | End: 2025-08-29

## 2025-08-29 RX ORDER — FENTANYL CITRATE 50 UG/ML
INJECTION, SOLUTION INTRAMUSCULAR; INTRAVENOUS
Status: DISCONTINUED | OUTPATIENT
Start: 2025-08-29 | End: 2025-08-29

## 2025-08-29 RX ORDER — EPHEDRINE SULFATE 50 MG/ML
INJECTION, SOLUTION INTRAVENOUS
Status: DISCONTINUED | OUTPATIENT
Start: 2025-08-29 | End: 2025-08-29

## 2025-08-29 RX ORDER — MIDAZOLAM HYDROCHLORIDE 1 MG/ML
INJECTION INTRAMUSCULAR; INTRAVENOUS
Status: DISCONTINUED | OUTPATIENT
Start: 2025-08-29 | End: 2025-08-29

## 2025-08-29 RX ORDER — ONDANSETRON HYDROCHLORIDE 2 MG/ML
INJECTION, SOLUTION INTRAVENOUS
Status: DISCONTINUED | OUTPATIENT
Start: 2025-08-29 | End: 2025-08-29

## 2025-08-29 RX ADMIN — LIDOCAINE HYDROCHLORIDE 50 MG: 20 INJECTION, SOLUTION EPIDURAL; INFILTRATION; INTRACAUDAL; PERINEURAL at 11:08

## 2025-08-29 RX ADMIN — SODIUM CHLORIDE, POTASSIUM CHLORIDE, SODIUM LACTATE AND CALCIUM CHLORIDE: 600; 310; 30; 20 INJECTION, SOLUTION INTRAVENOUS at 10:08

## 2025-08-29 RX ADMIN — ACETAMINOPHEN 1000 MG: 10 INJECTION INTRAVENOUS at 11:08

## 2025-08-29 RX ADMIN — DEXAMETHASONE SODIUM PHOSPHATE 4 MG: 4 INJECTION, SOLUTION INTRA-ARTICULAR; INTRALESIONAL; INTRAMUSCULAR; INTRAVENOUS; SOFT TISSUE at 11:08

## 2025-08-29 RX ADMIN — MIDAZOLAM HYDROCHLORIDE 2 MG: 1 INJECTION, SOLUTION INTRAMUSCULAR; INTRAVENOUS at 11:08

## 2025-08-29 RX ADMIN — FENTANYL CITRATE 100 MCG: 50 INJECTION, SOLUTION INTRAMUSCULAR; INTRAVENOUS at 11:08

## 2025-08-29 RX ADMIN — PROPOFOL 100 MG: 10 INJECTION, EMULSION INTRAVENOUS at 11:08

## 2025-08-29 RX ADMIN — ONDANSETRON 4 MG: 2 INJECTION INTRAMUSCULAR; INTRAVENOUS at 11:08

## 2025-08-29 RX ADMIN — SUGAMMADEX 200 MG: 100 INJECTION, SOLUTION INTRAVENOUS at 11:08

## 2025-08-29 RX ADMIN — ROCURONIUM BROMIDE 40 MG: 10 INJECTION, SOLUTION INTRAVENOUS at 11:08

## 2025-08-29 RX ADMIN — EPHEDRINE SULFATE 10 MG: 50 INJECTION INTRAVENOUS at 11:08

## 2025-09-04 DIAGNOSIS — C05.1 SQUAMOUS CELL CARCINOMA OF SOFT PALATE: Primary | ICD-10-CM

## 2025-09-05 LAB
PD-L1 BY 22C3 TISS IMSTN DOC: POSITIVE
TEMPUS PD-L1 (22C3) COMBINED POSITIVE SCORE: 10
TEMPUS PD-L1 (22C3) TUMOR PROPORTION SCORE: 10 %
TEMPUS PORTAL: NORMAL
TEMPUS THERAPY1: NORMAL
TEMPUS THERAPYCOUNT: 1

## (undated) DEVICE — SOL 9P NACL IRR PIC IL

## (undated) DEVICE — SOL WATER STRL IRR 1000ML

## (undated) DEVICE — GLOVE SURG ULTRA TOUCH 8

## (undated) DEVICE — PACK NASAL SINUS

## (undated) DEVICE — SUT GUT CHROMIC 4-0 RB-1 27

## (undated) DEVICE — NDL 10CC 20GAX1 COMBO

## (undated) DEVICE — DEVICE INFLATION SE SINUS BLLN

## (undated) DEVICE — CANISTER SUCTION 3000CC

## (undated) DEVICE — SOL .9NACL PF 100 ML

## (undated) DEVICE — GLOVE SURG ULTRA TOUCH 7

## (undated) DEVICE — SUT 2/0 30IN SILK BLK BRAI

## (undated) DEVICE — SPONGE GAUZE 16PLY 4X4

## (undated) DEVICE — SOL .9 NACL 50 ML

## (undated) DEVICE — BLADE SHAVER ENDO 2.9MMX18CM

## (undated) DEVICE — UNDERGLOVES BIOGEL PI SZ 7 LF

## (undated) DEVICE — NDL SPINAL 25GX3.5 SPINOCAN

## (undated) DEVICE — TRAP MUCUS SPECIMEN 80CC

## (undated) DEVICE — BLADE SURG #15 CARBON STEEL

## (undated) DEVICE — SOL NACL .9% 250ML INJ

## (undated) DEVICE — BLLN SYS SIMUPLASTY 6MM